# Patient Record
Sex: MALE | Race: WHITE | NOT HISPANIC OR LATINO | Employment: OTHER | URBAN - METROPOLITAN AREA
[De-identification: names, ages, dates, MRNs, and addresses within clinical notes are randomized per-mention and may not be internally consistent; named-entity substitution may affect disease eponyms.]

---

## 2019-04-17 ASSESSMENT — ACTIVITIES OF DAILY LIVING (ADL): CURRENT_FUNCTION: NO ASSISTANCE NEEDED

## 2019-04-23 ENCOUNTER — OFFICE VISIT (OUTPATIENT)
Dept: FAMILY MEDICINE | Facility: CLINIC | Age: 69
End: 2019-04-23
Payer: COMMERCIAL

## 2019-04-23 VITALS
WEIGHT: 208 LBS | SYSTOLIC BLOOD PRESSURE: 120 MMHG | RESPIRATION RATE: 16 BRPM | BODY MASS INDEX: 29.12 KG/M2 | TEMPERATURE: 97.5 F | HEIGHT: 71 IN | HEART RATE: 56 BPM | DIASTOLIC BLOOD PRESSURE: 64 MMHG

## 2019-04-23 DIAGNOSIS — Z13.6 CARDIOVASCULAR SCREENING; LDL GOAL LESS THAN 100: ICD-10-CM

## 2019-04-23 DIAGNOSIS — I71.40 ABDOMINAL AORTIC ANEURYSM (AAA) WITHOUT RUPTURE (H): Primary | ICD-10-CM

## 2019-04-23 DIAGNOSIS — C67.9 MALIGNANT NEOPLASM OF URINARY BLADDER, UNSPECIFIED SITE (H): ICD-10-CM

## 2019-04-23 DIAGNOSIS — Z23 NEED FOR VACCINATION: ICD-10-CM

## 2019-04-23 DIAGNOSIS — Z12.5 SCREENING FOR PROSTATE CANCER: ICD-10-CM

## 2019-04-23 DIAGNOSIS — I10 BENIGN ESSENTIAL HYPERTENSION: ICD-10-CM

## 2019-04-23 DIAGNOSIS — Z00.00 ANNUAL PHYSICAL EXAM: ICD-10-CM

## 2019-04-23 LAB
ALBUMIN SERPL-MCNC: 3.7 G/DL (ref 3.4–5)
ALP SERPL-CCNC: 34 U/L (ref 40–150)
ALT SERPL W P-5'-P-CCNC: 18 U/L (ref 0–70)
ANION GAP SERPL CALCULATED.3IONS-SCNC: 4 MMOL/L (ref 3–14)
AST SERPL W P-5'-P-CCNC: 21 U/L (ref 0–45)
BILIRUB SERPL-MCNC: 0.5 MG/DL (ref 0.2–1.3)
BUN SERPL-MCNC: 17 MG/DL (ref 7–30)
CALCIUM SERPL-MCNC: 9.1 MG/DL (ref 8.5–10.1)
CHLORIDE SERPL-SCNC: 105 MMOL/L (ref 94–109)
CHOLEST SERPL-MCNC: 143 MG/DL
CO2 SERPL-SCNC: 29 MMOL/L (ref 20–32)
CREAT SERPL-MCNC: 0.99 MG/DL (ref 0.66–1.25)
GFR SERPL CREATININE-BSD FRML MDRD: 77 ML/MIN/{1.73_M2}
GLUCOSE SERPL-MCNC: 89 MG/DL (ref 70–99)
HDLC SERPL-MCNC: 49 MG/DL
HGB BLD-MCNC: 14 G/DL (ref 13.3–17.7)
LDLC SERPL CALC-MCNC: 77 MG/DL
NONHDLC SERPL-MCNC: 94 MG/DL
POTASSIUM SERPL-SCNC: 4.5 MMOL/L (ref 3.4–5.3)
PROT SERPL-MCNC: 8.2 G/DL (ref 6.8–8.8)
PSA SERPL-ACNC: 2.11 UG/L (ref 0–4)
SODIUM SERPL-SCNC: 138 MMOL/L (ref 133–144)
TRIGL SERPL-MCNC: 85 MG/DL

## 2019-04-23 PROCEDURE — G0103 PSA SCREENING: HCPCS | Performed by: FAMILY MEDICINE

## 2019-04-23 PROCEDURE — 85018 HEMOGLOBIN: CPT | Performed by: FAMILY MEDICINE

## 2019-04-23 PROCEDURE — 80061 LIPID PANEL: CPT | Performed by: FAMILY MEDICINE

## 2019-04-23 PROCEDURE — 80053 COMPREHEN METABOLIC PANEL: CPT | Performed by: FAMILY MEDICINE

## 2019-04-23 PROCEDURE — 90471 IMMUNIZATION ADMIN: CPT | Performed by: FAMILY MEDICINE

## 2019-04-23 PROCEDURE — G0438 PPPS, INITIAL VISIT: HCPCS | Performed by: FAMILY MEDICINE

## 2019-04-23 PROCEDURE — 36415 COLL VENOUS BLD VENIPUNCTURE: CPT | Performed by: FAMILY MEDICINE

## 2019-04-23 PROCEDURE — 90714 TD VACC NO PRESV 7 YRS+ IM: CPT | Performed by: FAMILY MEDICINE

## 2019-04-23 RX ORDER — LISINOPRIL 10 MG/1
TABLET ORAL
COMMUNITY
Start: 2019-01-15 | End: 2019-04-24

## 2019-04-23 ASSESSMENT — MIFFLIN-ST. JEOR: SCORE: 1734.73

## 2019-04-23 ASSESSMENT — ACTIVITIES OF DAILY LIVING (ADL): CURRENT_FUNCTION: NO ASSISTANCE NEEDED

## 2019-04-23 NOTE — Clinical Note
Please abstract the following data from this visit with this patient into the appropriate field in Epic:Colonoscopy done on this date: 4/14/16 (approximately), by this group: [facility], results were q 5 years.

## 2019-04-23 NOTE — PROGRESS NOTES
"SUBJECTIVE:   Luis Stapleton is a 68 year old male who presents for Preventive Visit.    Are you in the first 12 months of your Medicare coverage?  No    Healthy Habits:     In general, how would you rate your overall health?  Good    Frequency of exercise:  6-7 days/week    Duration of exercise:  Greater than 60 minutes    Do you usually eat at least 4 servings of fruit and vegetables a day, include whole grains    & fiber and avoid regularly eating high fat or \"junk\" foods?  Yes    Taking medications regularly:  Yes    Medication side effects:  None    Ability to successfully perform activities of daily living:  No assistance needed    Home Safety:  No safety concerns identified    Hearing Impairment:  Difficulty following a conversation in a noisy restaurant or crowded room    In the past 6 months, have you been bothered by leaking of urine?  No    In general, how would you rate your overall mental or emotional health?  Good      PHQ-2 Total Score: 0    Additional concerns today:  No        Patient has history of abdominal aortic aneurysm which was repaired in 2008.  Denies any new concerns.  Following up with cardiology on a regular basis.  He also has history of bladder cancer which is treated.  Currently on surveillance with urology.  Takes lisinopril for blood pressure along with simvastatin for cholesterol.  Do you feel safe in your environment? Yes    Do you have a Health Care Directive? Yes: Patient states has Advance Directive and will bring in a copy to clinic.    Fall risk  Fallen 2 or more times in the past year?: No  Any fall with injury in the past year?: No  click delete button to remove this line now  Cognitive Screening   1) Repeat 3 items (Leader, Season, Table)    2) Clock draw: NORMAL  3) 3 item recall: Recalls 3 objects  Results: 3 items recalled: COGNITIVE IMPAIRMENT LESS LIKELY    Mini-CogTM Copyright ASHLY Santos. Licensed by the author for use in [facility]; reprinted with " permission (gloria@[facility]). All rights reserved.      Do you have sleep apnea, excessive snoring or daytime drowsiness?: no    Reviewed and updated as needed this visit by clinical staff  Tobacco  Allergies  Meds  Fam Hx  Soc Hx        Reviewed and updated as needed this visit by Provider  Meds        Social History     Tobacco Use     Smoking status: Former Smoker     Packs/day: 1.00     Years: 45.00     Pack years: 45.00     Last attempt to quit: 3/4/2008     Years since quittin.1     Smokeless tobacco: Never Used   Substance Use Topics     Alcohol use: Yes     Comment: .5 OZ WEEK     If you drink alcohol do you typically have >3 drinks per day or >7 drinks per week? No    Alcohol Use 2019   Prescreen: >3 drinks/day or >7 drinks/week? No               Current providers sharing in care for this patient include:   Patient Care Team:  Clinic, [facility] [facility] [place] as PCP - General    The following health maintenance items are reviewed in Epic and correct as of today:  Health Maintenance   Topic Date Due     HEPATITIS C SCREENING  1968     LIPID SCREEN Q5 YR MALE (SYSTEM ASSIGNED)  1984     COLON CANCER SCREEN (SYSTEM ASSIGNED)  1999     ADVANCE DIRECTIVE PLANNING Q5 YRS  2004     ZOSTER IMMUNIZATION (2 of 3) 2013     MEDICARE ANNUAL WELLNESS VISIT  2014     FALL RISK ASSESSMENT  2014     INFLUENZA VACCINE (1) 2018     DTAP/TDAP/TD IMMUNIZATION (2 - Td) 2019     PHQ-2  Completed     PNEUMOCOCCAL IMMUNIZATION 65+ LOW/MEDIUM RISK  Completed     AORTIC ANEURYSM SCREENING (SYSTEM ASSIGNED)  Completed     IPV IMMUNIZATION  Aged Out     MENINGITIS IMMUNIZATION  Aged Out       Pneumonia Vaccine:Adults age 65+ who received Pneumovax (PPSV23) at 65 years or older: Should be given PCV13 > 1 year after their most recent PPSV23    Review of Systems  CONSTITUTIONAL: NEGATIVE for fever, chills, change in weight  INTEGUMENTARY/SKIN: NEGATIVE for worrisome rashes,  "moles or lesions  EYES: NEGATIVE for vision changes or irritation  ENT/MOUTH: NEGATIVE for ear, mouth and throat problems  RESP: NEGATIVE for significant cough or SOB  BREAST: NEGATIVE for masses, tenderness or discharge  CV: NEGATIVE for chest pain, palpitations or peripheral edema  GI: NEGATIVE for nausea, abdominal pain, heartburn, or change in bowel habits  : NEGATIVE for frequency, dysuria, or hematuria  MUSCULOSKELETAL: NEGATIVE for significant arthralgias or myalgia  NEURO: NEGATIVE for weakness, dizziness or paresthesias  ENDOCRINE: NEGATIVE for temperature intolerance, skin/hair changes  HEME: NEGATIVE for bleeding problems  PSYCHIATRIC: NEGATIVE for changes in mood or affect    OBJECTIVE:   /64   Pulse 56   Temp 97.5  F (36.4  C) (Tympanic)   Resp 16   Ht 1.81 m (5' 11.26\")   Wt 94.3 kg (208 lb)   BMI 28.80 kg/m   Estimated body mass index is 28.8 kg/m  as calculated from the following:    Height as of this encounter: 1.81 m (5' 11.26\").    Weight as of this encounter: 94.3 kg (208 lb).  Physical Exam  GENERAL: healthy, alert and no distress  EYES: Eyes grossly normal to inspection, PERRL and conjunctivae and sclerae normal  HENT: ear canals and TM's normal, nose and mouth without ulcers or lesions  NECK: no adenopathy, no asymmetry, masses, or scars and thyroid normal to palpation  RESP: lungs clear to auscultation - no rales, rhonchi or wheezes  CV: regular rate and rhythm, normal S1 S2, no S3 or S4, no murmur, click or rub, no peripheral edema and peripheral pulses strong  ABDOMEN: soft, nontender, no hepatosplenomegaly, no masses and bowel sounds normal  MS: no gross musculoskeletal defects noted, no edema  SKIN: no suspicious lesions or rashes  NEURO: Normal strength and tone, mentation intact and speech normal  PSYCH: mentation appears normal, affect normal/bright    Diagnostic Test Results:  none     ASSESSMENT / PLAN:   1. Annual physical exam    - Lipid panel reflex to direct LDL " "Fasting  - Comprehensive metabolic panel  - Hemoglobin    2. Abdominal aortic aneurysm (AAA) without rupture (H)  Current no concerns.    3. CARDIOVASCULAR SCREENING; LDL GOAL LESS THAN 100  We will fill the medication once labs reviewed.  - Lipid panel reflex to direct LDL Fasting  - Comprehensive metabolic panel    4. Benign essential hypertension  We will follow-up on lab refill the medication lisinopril once labs reviewed.  - Comprehensive metabolic panel    5. Malignant neoplasm of urinary bladder, unspecified site (H)      6. Screening for prostate cancer  We will do PSA for screening.  - PSA, screen    End of Life Planning:  Patient currently has an advanced directive: No.  I have verified the patient's ablity to prepare an advanced directive/make health care decisions.  Literature was provided to assist patient in preparing an advanced directive.    COUNSELING:  Reviewed preventive health counseling, as reflected in patient instructions       Regular exercise       Healthy diet/nutrition    BP Readings from Last 1 Encounters:   04/23/19 120/64     Estimated body mass index is 28.8 kg/m  as calculated from the following:    Height as of this encounter: 1.81 m (5' 11.26\").    Weight as of this encounter: 94.3 kg (208 lb).           reports that he quit smoking about 11 years ago. He has a 45.00 pack-year smoking history. He has never used smokeless tobacco.      Appropriate preventive services were discussed with this patient, including applicable screening as appropriate for cardiovascular disease, diabetes, osteopenia/osteoporosis, and glaucoma.  As appropriate for age/gender, discussed screening for colorectal cancer, prostate cancer, breast cancer, and cervical cancer. Checklist reviewing preventive services available has been given to the patient.    Reviewed patients plan of care and provided an AVS. The Basic Care Plan (routine screening as documented in Health Maintenance) for Luis meets the Care " Plan requirement. This Care Plan has been established and reviewed with the Patient.    Counseling Resources:  ATP IV Guidelines  Pooled Cohorts Equation Calculator  Breast Cancer Risk Calculator  FRAX Risk Assessment  ICSI Preventive Guidelines  Dietary Guidelines for Americans, 2010  USDA's MyPlate  ASA Prophylaxis  Lung CA Screening    Cam Avila MD  [facility]    Identified Health Risks:

## 2019-04-24 RX ORDER — LISINOPRIL 10 MG/1
10 TABLET ORAL DAILY
Qty: 90 TABLET | Refills: 3 | Status: SHIPPED | OUTPATIENT
Start: 2019-04-24 | End: 2020-06-22

## 2019-04-24 RX ORDER — SIMVASTATIN 40 MG
40 TABLET ORAL AT BEDTIME
Qty: 90 TABLET | Refills: 3 | Status: SHIPPED | OUTPATIENT
Start: 2019-04-24 | End: 2020-06-22

## 2019-10-03 ENCOUNTER — HEALTH MAINTENANCE LETTER (OUTPATIENT)
Age: 69
End: 2019-10-03

## 2019-10-29 ENCOUNTER — HOSPITAL ENCOUNTER (OUTPATIENT)
Dept: CT IMAGING | Facility: CLINIC | Age: 69
Discharge: HOME | End: 2019-10-29
Attending: FAMILY MEDICINE | Admitting: FAMILY MEDICINE
Payer: COMMERCIAL

## 2019-10-29 ENCOUNTER — TELEPHONE (OUTPATIENT)
Dept: FAMILY MEDICINE | Facility: CLINIC | Age: 69
End: 2019-10-29

## 2019-10-29 ENCOUNTER — ANCILLARY PROCEDURE (OUTPATIENT)
Dept: GENERAL RADIOLOGY | Facility: CLINIC | Age: 69
End: 2019-10-29
Attending: FAMILY MEDICINE
Payer: COMMERCIAL

## 2019-10-29 ENCOUNTER — OFFICE VISIT (OUTPATIENT)
Dept: FAMILY MEDICINE | Facility: CLINIC | Age: 69
End: 2019-10-29
Payer: COMMERCIAL

## 2019-10-29 VITALS
OXYGEN SATURATION: 94 % | SYSTOLIC BLOOD PRESSURE: 100 MMHG | BODY MASS INDEX: 27.55 KG/M2 | DIASTOLIC BLOOD PRESSURE: 60 MMHG | WEIGHT: 199 LBS | TEMPERATURE: 98 F | HEART RATE: 82 BPM

## 2019-10-29 DIAGNOSIS — R05.9 COUGH: Primary | ICD-10-CM

## 2019-10-29 DIAGNOSIS — R91.8 LUNG MASS: ICD-10-CM

## 2019-10-29 DIAGNOSIS — R05.9 COUGH: ICD-10-CM

## 2019-10-29 DIAGNOSIS — R53.83 OTHER FATIGUE: ICD-10-CM

## 2019-10-29 LAB — RADIOLOGIST FLAGS: ABNORMAL

## 2019-10-29 PROCEDURE — 71250 CT THORAX DX C-: CPT

## 2019-10-29 PROCEDURE — 99214 OFFICE O/P EST MOD 30 MIN: CPT | Performed by: FAMILY MEDICINE

## 2019-10-29 PROCEDURE — 71046 X-RAY EXAM CHEST 2 VIEWS: CPT | Mod: FY

## 2019-10-29 NOTE — TELEPHONE ENCOUNTER
Dr. Tomas Gaspar Pulmonology calling back read the CT report and is not recommending the biopsy that radiology is recommending.  He states the pt should have a PET scan done before seeing pulmonology and then be seen in office.  Dr. Gaspar is rounding in the hospital all week and states the pt will not be seen this week but would like to s/w Dr. Avila to discuss.    Please see contact information for Dr. Gaspar on desk.  If questions see nurse.    Shayla HIGGINS RN  EP Triage

## 2019-10-29 NOTE — PROGRESS NOTES
Subjective     Luis Stapleton is a 69 year old male who presents to clinic today for the following health issues:    HPI   Acute Illness   Acute illness concerns: cough, short of breath - pain in the right side of back  Onset: 2-3 weeks    Fever: no    Chills/Sweats: no    Headache (location?): no    Sinus Pressure:no    Conjunctivitis:  no    Ear Pain: no    Rhinorrhea: no    Congestion: YES    Sore Throat: no     Cough: YES    Wheeze: YES    Decreased Appetite: YES    Nausea: no    Vomiting: no    Diarrhea:  no    Dysuria/Freq.: no    Fatigue/Achiness: YES    Sick/Strep Exposure: no     Therapies Tried and outcome: NONE  Cough is nonproductive.  He noticed decreased appetite.  Some weight loss over the last few months but he told me he is trying to lose weight.  Denies any chest pain but sometimes occasional short of breath ,low energy.          Reviewed and updated as needed this visit by Provider         Review of Systems   ROS COMP: Constitutional, HEENT, cardiovascular, pulmonary, gi and gu systems are negative, except as otherwise noted.      Objective    /60   Pulse 82   Temp 98  F (36.7  C) (Tympanic)   Wt 90.3 kg (199 lb)   SpO2 94%   BMI 27.55 kg/m    Body mass index is 27.55 kg/m .  Physical Exam   GENERAL: healthy, alert and no distress  NECK: no adenopathy, no asymmetry, masses, or scars and thyroid normal to palpation  RESP: Decreased breath sounds on the right middle lobe of lungs.  No wheezing  CV: regular rate and rhythm, normal S1 S2, no S3 or S4, no murmur, click or rub, no peripheral edema and peripheral pulses strong  ABDOMEN: soft, nontender, no hepatosplenomegaly, no masses and bowel sounds normal  MS: no gross musculoskeletal defects noted, no edema            Assessment & Plan     1. Cough    - XR Chest 2 Views; Future    2. Other fatigue      3. Lung mass  Patient chest x-ray reviewed and noted to have a round mass in the right middle lobe.  Etiology not clear.  I ordered CT  scan for further evaluation.  Differential include underlying tumor versus other causes.  At this time first we reviewed the CT scan and if there is any change we probably need to have him see pulmonology for possible further evaluation and if needed biopsy.  - CT Chest w/o Contrast; Future         Cam Avila MD  [facility]

## 2019-10-30 ENCOUNTER — OFFICE VISIT (OUTPATIENT)
Dept: FAMILY MEDICINE | Facility: CLINIC | Age: 69
End: 2019-10-30
Payer: COMMERCIAL

## 2019-10-30 VITALS
SYSTOLIC BLOOD PRESSURE: 112 MMHG | BODY MASS INDEX: 27.55 KG/M2 | DIASTOLIC BLOOD PRESSURE: 64 MMHG | OXYGEN SATURATION: 96 % | WEIGHT: 199 LBS | HEART RATE: 80 BPM | TEMPERATURE: 98.7 F

## 2019-10-30 DIAGNOSIS — R91.8 RIGHT LOWER LOBE LUNG MASS: Primary | ICD-10-CM

## 2019-10-30 PROCEDURE — 99214 OFFICE O/P EST MOD 30 MIN: CPT | Performed by: FAMILY MEDICINE

## 2019-10-30 RX ORDER — MULTIVIT WITH MINERALS/LUTEIN
1 TABLET ORAL DAILY
COMMUNITY

## 2019-10-30 NOTE — PROGRESS NOTES
Subjective     Luis Stapleton is a 69 year old male who presents to clinic today for the following health issues:    HPI   Concern - discuss CT results   Patient came today with his wife to discuss CT results.  He was seen yesterday secondary to some pain in the back and mild cough for the last few weeks along with some weight loss.  X-ray was done initially which indicate a big mass on the lung and immediately CT scan was ordered which confirmed the finding noted to have a approximatley 6 cm mass in the right lower lobe.  Patient also has remote history of bladder cancer he is going to his urologist once a year.  He denies any other symptoms at this time other than fatigue tiredness.      Patient Active Problem List   Diagnosis     CARDIOVASCULAR SCREENING; LDL GOAL LESS THAN 100     Benign essential hypertension     Malignant neoplasm of urinary bladder, unspecified site (H)     Abdominal aortic aneurysm (AAA) without rupture (H)     Past Surgical History:   Procedure Laterality Date     CYSTOSCOPY, BIOPSY BLADDER, COMBINED  2012    Procedure: COMBINED CYSTOSCOPY, BIOPSY BLADDER;;  Surgeon: Jonas Young MD;  Location:  OR     CYSTOSCOPY, RETROGRADES, COMBINED  2012    Procedure: COMBINED CYSTOSCOPY, RETROGRADES;  CYSTOSCOPY, BILATERAL RETROGRADES, BLADDER BIOPSY, FULGURATION;  Surgeon: Jonas Young MD;  Location: [facility] OR     CYSTOSCOPY, TRANSURETHRAL RESECTION (TUR) TUMOR BLADDER, COMBINED       HERNIA REPAIR       REPAIR ANEURYSM ABDOMINAL AORTA             Social History     Tobacco Use     Smoking status: Former Smoker     Packs/day: 1.00     Years: 45.00     Pack years: 45.00     Last attempt to quit: 3/4/2008     Years since quittin.6     Smokeless tobacco: Never Used   Substance Use Topics     Alcohol use: Yes     Comment: .5 OZ WEEK     No family history on file.          Reviewed and updated as needed this visit by Provider         Review of Systems   ROS COMP:  Constitutional, HEENT, cardiovascular, pulmonary, gi and gu systems are negative, except as otherwise noted.      Objective    /64   Pulse 80   Temp 98.7  F (37.1  C) (Tympanic)   Wt 90.3 kg (199 lb)   SpO2 96%   BMI 27.55 kg/m    Body mass index is 27.55 kg/m .  Physical Exam   GENERAL: healthy, alert and no distress  NECK: no adenopathy, no asymmetry, masses, or scars and thyroid normal to palpation  RESP: Slight decreased breath sound on the right side.  No wheezing.  CV: regular rate and rhythm, normal S1 S2, no S3 or S4, no murmur, click or rub, no peripheral edema and peripheral pulses strong          Assessment & Plan     1. Right lower lobe lung mass  We reviewed CT scan in the office along with x-ray report.  I had a lengthy discussion with the patient and I had a discussion with the pulmonologist this morning.  Patient has a lung mass which is  present isolated in the right lobe.  Differential includes malignancy which is high.  He will need further evaluation for that.  I spoke with pulmonology and they would like to have PET scan prior to the appointment ,which will be scheduled by their office.  He will get biopsy and tissue diagnosis.  Once biopsy is back then they can determine further management based on that.  Patient appointment is made with [facility] on 8th November.  I gave all the resources and information and possible further management steps.    Patient is medically optimal for any procedure without any further work-up.           Cam Avila MD  [facility]

## 2019-10-30 NOTE — TELEPHONE ENCOUNTER
Spoke with the pulmonologist.  Appreciate their direction.  I will see this patient this afternoon and discussed in detail.  He will probably need biopsy as well as more imaging PET scan.  We will make those arrangements.  Spoke with  and she will make the appointment next available and also advised him to have a PET scan ordered for the patient before seeing Dr. Marin

## 2019-10-30 NOTE — TELEPHONE ENCOUNTER
Patient is scheduled to see Pulmonology 11/08 at 2:45 with Dr. Gaspar and Sabi from Pulmonology will be contacting the patient to schedule a PET scan.      .Avis BELLO    [facility]

## 2019-11-04 ENCOUNTER — TELEPHONE (OUTPATIENT)
Dept: FAMILY MEDICINE | Facility: CLINIC | Age: 69
End: 2019-11-04

## 2019-11-04 NOTE — TELEPHONE ENCOUNTER
I spoke with Dr Hubert Cadena, told her it was communicated with him and he told me his office will help schedule the PET scan which was recommend by him after reviewing CT scan.  She will speak with him and let us know.  If they want to call and reach me, they can call me at my cell [phone].  Please check back later if it is scheduled.

## 2019-11-04 NOTE — TELEPHONE ENCOUNTER
Dr. Avila reached out to Nurse and informed her that he spoke with Dr. Gaspar and Dr. Gaspar told Dr Avila that his office would help schedule the PET scan which was recommended by Dr. Gaspar.     Per note below, nurse Surinder who Dr. Avila spoke to stated that she will speak with Dr. Gaspar and get back to us here at the clinic.     Called patient to inform him of this, so that he is aware. Informed patient he might get a call from Dr. Gaspar's office otherwise we will be following up to make sure patient gets this scheduled. Patient stated he will call back if he does not hear from Dr. Gaspar's office.     Please keep encounter open until patient is scheduled for PET scan prior to office visit on 11/8/19. Thank you.    Daisy Dickinson RN, BSN  [facility] [place]

## 2019-11-04 NOTE — TELEPHONE ENCOUNTER
Called [facility] [phone] to follow up regarding the PET scan.    Spoke with the nurse Surinder at the [facility] about if Dr. Gaspar ordering the PET scan for patient (since Dr. Avila stated this was Dr. Oliver recommendation to have this done).     Surinder stated that she sent a message to Dr. Gaspar, but still has not heard back from him today (3:48 pm). Surinder stated that once she hears back from Dr. Gaspar, she can give the clinic a call at [phone] and speak with a triage nurse regarding if Dr. Gaspar will be ordered the PET scan.     Informed Surinder that RN will call tomorrow to follow up on this. Surinder does not have a direct line, Surinder stated to call the main number above and have them transfer the phone over to the nurse. Surinder is okay with clinic checking in with Dr. Gaspar's office tomorrow.     Again, patient has scheduled office visit with [facility] on 11/8/19 and would need the PET scan done prior. Please make sure PET scan is ordered and patient is scheduled for this prior to appointment.     Called patient to update him that we still have not heard back from Dr. Gaspar. Message was sent though to him. Patient verbalized understanding and agrees with plan. Informed patient RN's will follow up tomorrow and inform him of what is going on.     Daisy Dickinson RN, BSN  [facility]

## 2019-11-04 NOTE — TELEPHONE ENCOUNTER
Called patient back to gather more information.     Per Dr. Avila's note from office visit below:   I spoke with pulmonology and they would like to have PET scan prior to the appointment ,which will be scheduled by their office.    Advised for [facility] to [facility] (patient has the contact information) and ask for Dr. Gaspar to order the PET scan and have them help schedule this before his appointment on 11/8/19. Patient verbalized understanding and agrees with plan.     Patient will call back if he has any issues regarding this.     Daisy Dickinson RN, BSN  [facility] [place]

## 2019-11-04 NOTE — TELEPHONE ENCOUNTER
Reason for call:  Other   Patient called regarding (reason for call): call back  Additional comments: Pt states that Dr. Avila had scheduled a pulmonologist appt. For pt on 11/8/2019.  Pt states that Dr. Avila had told him he needed a pet scan prior to this appt.  Pt is unclear about who is scheduling the pet scan and has not heard anything about it yet.      Phone number to reach patient:  Home number on file [phone] (home) or Cell number on file:    No relevant phone numbers on file.       Best Time:  All day today is fine 11/4    Can we leave a detailed message on this number?  YES

## 2019-11-04 NOTE — TELEPHONE ENCOUNTER
Patient states that he spoke with Surinder DAVIES at [facility] and was informed that patient's primary doctor needs to order the PET scan.    Patient needs PET scan done before his 11/8 appointment.  Carlie García RN

## 2019-11-05 ENCOUNTER — TELEPHONE (OUTPATIENT)
Dept: FAMILY MEDICINE | Facility: CLINIC | Age: 69
End: 2019-11-05

## 2019-11-05 PROBLEM — R91.8 RIGHT LOWER LOBE LUNG MASS: Status: ACTIVE | Noted: 2019-11-05

## 2019-11-05 NOTE — TELEPHONE ENCOUNTER
Pt called back and he received a call from Dr Gaspar's office and was advised to keep his appt on Nov 8 even though he has not had the PET scan. Per pt request,  TC scheduled the PET scan at [facility] on Nov 18 at 9:15 am.  Prep : Arrive at 9:00 am  NPO 6 hours prior   No physical exercise 24 hours prior to test  Eat a low carb high protein meal 7 hours prior.  Pt notified and agrees  With the plan.  Jacqueline Allen,

## 2019-11-05 NOTE — TELEPHONE ENCOUNTER
Reason for Call:  Other call back    Detailed comments: Patient called upset because he had an appt today for a pet scan and was cancelled due to scan machine is broken, they offered to re schedule but only until next month. Patient wants to talk to care team for suggestions     Phone Number Patient can be reached at: Cell number on file:    No relevant phone numbers on file.       Best Time: any     Can we leave a detailed message on this number? YES    Call taken on 11/5/2019 at 12:57 PM by Francheska Moreno

## 2019-11-05 NOTE — TELEPHONE ENCOUNTER
Called [facility] to find the next available time for a PET scan:  Friday, Nov 8 at the   Monday, Nov 18 at [facility]  Tuesday, Nov 19 at [facility]  Spoke with the pt and gave him the information. Pt stated the Sabi from Dr Gaspar's office was also working on this and he was waiting for a call back from her. He will call back and speak with a TC with how he wants to proceed.  Jacqueline Allen,

## 2019-11-05 NOTE — TELEPHONE ENCOUNTER
Routing back to team to assist with helping patient get scheduled with PET scan at different location. Thank you.    Daisy Dickinson RN, BSN  [facility]

## 2019-11-05 NOTE — TELEPHONE ENCOUNTER
I have tried to get more information.  PET scan is ordered and spoke with the prior authorization people.  The reason to get PET scan and also per recommendation of pulmonologist.  Gave information to the .  She will try to get more information and see if that can be scheduled.  I spoke with the patient as well and informed him PET scan is more of the diagnostic and see if there is any other areas may or may not be involved.  The next step in this kind of finding would be to see pulmonologist and see when the biopsy can be done to make a tissue diagnosis.  We will try to get it done sooner.

## 2019-11-05 NOTE — TELEPHONE ENCOUNTER
Huddled with Dr. Avila, Dr. Avila stated patient is scheduled for 1:30 pm for PET scan today 11/5/19. Closing encounter at this time.     Daisy Dickinson RN, BSN  [facility] [place]

## 2019-11-08 ENCOUNTER — TRANSFERRED RECORDS (OUTPATIENT)
Dept: HEALTH INFORMATION MANAGEMENT | Facility: CLINIC | Age: 69
End: 2019-11-08

## 2019-11-15 ENCOUNTER — TELEPHONE (OUTPATIENT)
Dept: FAMILY MEDICINE | Facility: CLINIC | Age: 69
End: 2019-11-15

## 2019-11-15 NOTE — TELEPHONE ENCOUNTER
Reason for Call:  Other [facility]    Detailed comments: Alecwily from [facility] called she need pt pre-op paper work to be Faxed soon as possibil pt has surgery on Monday morning 11/18/2019. Please fax to Fax # [phone]    Phone Number Patient can be reached at: Other phone number:  [phone]    Best Time: anytime    Can we leave a detailed message on this number? YES    Call taken on 11/15/2019 at 11:15 AM by Stefanie Malagon

## 2019-11-15 NOTE — TELEPHONE ENCOUNTER
Pre-op faxed.      .vAis BELLO    [facility] [place]

## 2019-11-26 ENCOUNTER — TRANSFERRED RECORDS (OUTPATIENT)
Dept: HEALTH INFORMATION MANAGEMENT | Facility: CLINIC | Age: 69
End: 2019-11-26

## 2019-12-06 ENCOUNTER — OFFICE VISIT (OUTPATIENT)
Dept: FAMILY MEDICINE | Facility: CLINIC | Age: 69
End: 2019-12-06
Payer: COMMERCIAL

## 2019-12-06 VITALS
BODY MASS INDEX: 27.55 KG/M2 | WEIGHT: 199 LBS | OXYGEN SATURATION: 98 % | TEMPERATURE: 97.9 F | SYSTOLIC BLOOD PRESSURE: 120 MMHG | HEART RATE: 68 BPM | DIASTOLIC BLOOD PRESSURE: 60 MMHG

## 2019-12-06 DIAGNOSIS — C34.31 MALIGNANT NEOPLASM OF LOWER LOBE OF RIGHT LUNG (H): ICD-10-CM

## 2019-12-06 DIAGNOSIS — I71.40 ABDOMINAL AORTIC ANEURYSM (AAA) WITHOUT RUPTURE (H): ICD-10-CM

## 2019-12-06 DIAGNOSIS — K76.9 LIVER LESION: ICD-10-CM

## 2019-12-06 DIAGNOSIS — Z01.818 PREOP GENERAL PHYSICAL EXAM: Primary | ICD-10-CM

## 2019-12-06 PROCEDURE — 99214 OFFICE O/P EST MOD 30 MIN: CPT | Performed by: FAMILY MEDICINE

## 2019-12-06 NOTE — PROGRESS NOTES
[facility] [place]  [address] [facility] [address]  [phone]  Dept: [phone]    PRE-OP EVALUATION:  Today's date: 2019    Luis Stapleton (: 1950) presents for pre-operative evaluation assessment as requested by Dr. Hong.  He requires evaluation and anesthesia risk assessment prior to undergoing surgery/procedure for treatment of liver lesion.    Proposed Surgery/ Procedure: lap surgical liver lesion biopsy  Date of Surgery/ Procedure: 12/10/19  Time of Surgery/ Procedure: 7am  Hospital/Surgical Facility: [facility]  Fax number for surgical facility: [phone]    Primary Physician: Cam Avila  Type of Anesthesia Anticipated: to be determined    Patient has a Health Care Directive or Living Will:  YES     1. NO - Do you have a history of heart attack, stroke, stent, bypass or surgery on an artery in the head, neck, heart or legs?   2. NO - Do you ever have any pain or discomfort in your chest?  3. NO - Do you have a history of  Heart Failure?  4. NO - Are you troubled by shortness of breath when: walking on the level, up a slight hill or at night?  5. NO - Do you currently have a cold, bronchitis or other respiratory infection?  6. YES - Do you have a cough, shortness of breath or wheezing?  7. NO - Do you sometimes get pains in the calves of your legs when you walk?  8. NO - Do you or anyone in your family have previous history of blood clots?  9. NO - Do you or does anyone in your family have a serious bleeding problem such as prolonged bleeding following surgeries or cuts?  10. NO - Have you ever had problems with anemia or been told to take iron pills?  11. NO - Have you had any abnormal blood loss such as black, tarry or bloody stools, or abnormal vaginal bleeding?  12. NO - Have you ever had a blood transfusion?  13. NO - Have you or any of your relatives ever had problems with anesthesia?  14. NO - Do you have sleep apnea, excessive snoring or daytime  drowsiness?  15. NO - Do you have any prosthetic heart valves?  16. NO - Do you have prosthetic joints?  17. NO - Is there any chance that you may be pregnant?      HPI:     HPI related to upcoming procedure:       See problem list for active medical problems.  Problems all longstanding and stable, except as noted/documented.  See ROS for pertinent symptoms related to these conditions.      MEDICAL HISTORY:     Patient Active Problem List    Diagnosis Date Noted     Malignant neoplasm of lower lobe of right lung (H) 12/06/2019     Priority: Medium     Right lower lobe lung mass 11/05/2019     Priority: Medium     Benign essential hypertension 04/23/2019     Priority: Medium     Malignant neoplasm of urinary bladder, unspecified site (H) 04/23/2019     Priority: Medium     Abdominal aortic aneurysm (AAA) without rupture (H) 04/23/2019     Priority: Medium     surgey in 2008       CARDIOVASCULAR SCREENING; LDL GOAL LESS THAN 100 10/31/2010     Priority: Medium      Past Medical History:   Diagnosis Date     Aneurysm (H)     ABDOMINAL      Calculus of kidney      Diverticulosis of colon (without mention of hemorrhage)      Malignant neoplasm of bladder, part unspecified      Other and unspecified hyperlipidemia      Past Surgical History:   Procedure Laterality Date     CYSTOSCOPY, BIOPSY BLADDER, COMBINED  6/18/2012    Procedure: COMBINED CYSTOSCOPY, BIOPSY BLADDER;;  Surgeon: Jonas Young MD;  Location:  OR     CYSTOSCOPY, RETROGRADES, COMBINED  6/18/2012    Procedure: COMBINED CYSTOSCOPY, RETROGRADES;  CYSTOSCOPY, BILATERAL RETROGRADES, BLADDER BIOPSY, FULGURATION;  Surgeon: Jonas Young MD;  Location:  OR     CYSTOSCOPY, TRANSURETHRAL RESECTION (TUR) TUMOR BLADDER, COMBINED       HERNIA REPAIR       REPAIR ANEURYSM ABDOMINAL AORTA      2009     Current Outpatient Medications   Medication Sig Dispense Refill     Cholecalciferol (VITAMIN D3 PO) Take 1,000 Units by mouth.         lisinopril  (PRINIVIL/ZESTRIL) 10 MG tablet Take 1 tablet (10 mg) by mouth daily 90 tablet 3     multivitamin (CENTRUM SILVER) tablet Take 1 tablet by mouth daily       Nutritional Supplements (BLADDER 2.2 PO) Take 1 tablet by mouth 2 times daily.         simvastatin (ZOCOR) 40 MG tablet Take 1 tablet (40 mg) by mouth At Bedtime 90 tablet 3     OTC products: None, except as noted above    No Known Allergies   Latex Allergy: NO    Social History     Tobacco Use     Smoking status: Former Smoker     Packs/day: 1.00     Years: 45.00     Pack years: 45.00     Last attempt to quit: 3/4/2008     Years since quittin.7     Smokeless tobacco: Never Used   Substance Use Topics     Alcohol use: Yes     Comment: .5 OZ WEEK     History   Drug Use Unknown       REVIEW OF SYSTEMS:   CONSTITUTIONAL: NEGATIVE for fever, chills, change in weight  INTEGUMENTARY/SKIN: NEGATIVE for worrisome rashes, moles or lesions  EYES: NEGATIVE for vision changes or irritation  ENT/MOUTH: NEGATIVE for ear, mouth and throat problems  RESP: NEGATIVE for significant cough or SOB  BREAST: NEGATIVE for masses, tenderness or discharge  CV: NEGATIVE for chest pain, palpitations or peripheral edema  GI: NEGATIVE for nausea, abdominal pain, heartburn, or change in bowel habits  : NEGATIVE for frequency, dysuria, or hematuria  MUSCULOSKELETAL: NEGATIVE for significant arthralgias or myalgia  NEURO: NEGATIVE for weakness, dizziness or paresthesias  ENDOCRINE: NEGATIVE for temperature intolerance, skin/hair changes  HEME: NEGATIVE for bleeding problems  PSYCHIATRIC: NEGATIVE for changes in mood or affect    EXAM:   /60   Pulse 68   Temp 97.9  F (36.6  C) (Tympanic)   Wt 90.3 kg (199 lb)   SpO2 98%   BMI 27.55 kg/m    GENERAL APPEARANCE: healthy, alert and no distress  HENT: ear canals and TM's normal and nose and mouth without ulcers or lesions  RESP: lungs clear to auscultation - no rales, rhonchi or wheezes  CV: regular rate and rhythm, normal S1 S2, no  S3 or S4 and no murmur, click or rub   ABDOMEN: soft, nontender, no HSM or masses and bowel sounds normal  NEURO: Normal strength and tone, sensory exam grossly normal, mentation intact and speech normal    DIAGNOSTICS:   US  REPORT -2015    ULTRASOUND AORTA/IVC/ILIAC DUPLEX COMPLETE February 25, 2015   hours     HISTORY: 64-year-old male with repair of ruptured abdominal aortic  aneurysm.     COMPARISON: March 16, 2010.     TECHNIQUE: Color Doppler and spectral waveform analysis performed  throughout the abdominal aorta.     FINDINGS: Aortic tube graft is widely patent. No velocity elevation to  suggest stenosis. Both common iliac arteries are patent. The right  common iliac artery measures up to 0.9 cm and left measures up to 1.2  cm.     IMPRESSION  IMPRESSION: Widely patent aortic surgical straight graft.       Recent Labs   Lab Test 04/23/19  0802   HGB 14.0      POTASSIUM 4.5   CR 0.99        IMPRESSION:   Reason for surgery/procedure:   Diagnosis/reason for consult:     ICD-10-CM    1. Preop general physical exam Z01.818    2. Malignant neoplasm of lower lobe of right lung (H) C34.31     CT scan done.   3. Liver lesion K76.9    4. Abdominal aortic aneurysm (AAA) without rupture (H) I71.4          The proposed surgical procedure is considered INTERMEDIATE risk.    REVISED CARDIAC RISK INDEX  The patient has the following serious cardiovascular risks for perioperative complications such as (MI, PE, VFib and 3  AV Block):  No serious cardiac risks  INTERPRETATION: 0 risks: Class I (very low risk - 0.4% complication rate)    The patient has the following additional risks for perioperative complications:  No identified additional risks      ICD-10-CM    1. Preop general physical exam Z01.818    2. Malignant neoplasm of lower lobe of right lung (H) C34.31     CT scan done.   3. Liver lesion K76.9    4. Abdominal aortic aneurysm (AAA) without rupture (H) I71.4        RECOMMENDATIONS:   Patient has a history of  abdominal aortic aneurysm without rupture his last exam was 2015, his stent was patent at that time. He denies any abdominal pain or issue with BP..  His blood pressure is well controlled.      --Patient is to take all scheduled medications on the day of surgery EXCEPT for modifications listed below.    APPROVAL GIVEN to proceed with proposed procedure, without further diagnostic evaluation       Signed Electronically by: Cam Avila MD    Copy of this evaluation report is provided to requesting physician.    [place] Preop Guidelines    Revised Cardiac Risk Index

## 2019-12-09 ENCOUNTER — TRANSFERRED RECORDS (OUTPATIENT)
Dept: HEALTH INFORMATION MANAGEMENT | Facility: CLINIC | Age: 69
End: 2019-12-09

## 2019-12-10 ENCOUNTER — TRANSFERRED RECORDS (OUTPATIENT)
Dept: HEALTH INFORMATION MANAGEMENT | Facility: CLINIC | Age: 69
End: 2019-12-10

## 2019-12-11 LAB
ALT SERPL-CCNC: 93 IU/L (ref 8–45)
AST SERPL-CCNC: 119 IU/L (ref 2–40)
CREAT SERPL-MCNC: 1 MG/DL (ref 0.72–1.25)
GFR SERPL CREATININE-BSD FRML MDRD: >60 ML/MIN/1.73M2
GLUCOSE SERPL-MCNC: 100 MG/DL (ref 65–100)
INR PPP: 1.3
POTASSIUM SERPL-SCNC: 5 MMOL/L (ref 3.5–5)

## 2019-12-16 ENCOUNTER — TRANSFERRED RECORDS (OUTPATIENT)
Dept: HEALTH INFORMATION MANAGEMENT | Facility: CLINIC | Age: 69
End: 2019-12-16

## 2019-12-20 ENCOUNTER — TRANSFERRED RECORDS (OUTPATIENT)
Dept: HEALTH INFORMATION MANAGEMENT | Facility: CLINIC | Age: 69
End: 2019-12-20

## 2019-12-26 ENCOUNTER — ANESTHESIA EVENT (OUTPATIENT)
Dept: SURGERY | Facility: CLINIC | Age: 69
End: 2019-12-26
Payer: COMMERCIAL

## 2019-12-27 ENCOUNTER — ANESTHESIA (OUTPATIENT)
Dept: SURGERY | Facility: CLINIC | Age: 69
End: 2019-12-27
Payer: COMMERCIAL

## 2019-12-27 ENCOUNTER — HOSPITAL ENCOUNTER (OUTPATIENT)
Facility: CLINIC | Age: 69
Discharge: HOME | End: 2019-12-27
Attending: THORACIC SURGERY (CARDIOTHORACIC VASCULAR SURGERY) | Admitting: THORACIC SURGERY (CARDIOTHORACIC VASCULAR SURGERY)
Payer: COMMERCIAL

## 2019-12-27 VITALS
DIASTOLIC BLOOD PRESSURE: 68 MMHG | BODY MASS INDEX: 25.92 KG/M2 | RESPIRATION RATE: 16 BRPM | WEIGHT: 191.38 LBS | HEIGHT: 72 IN | HEART RATE: 63 BPM | TEMPERATURE: 98.2 F | OXYGEN SATURATION: 97 % | SYSTOLIC BLOOD PRESSURE: 122 MMHG

## 2019-12-27 LAB — POTASSIUM SERPL-SCNC: 4.1 MMOL/L (ref 3.4–5.3)

## 2019-12-27 PROCEDURE — 25000128 H RX IP 250 OP 636: Performed by: NURSE ANESTHETIST, CERTIFIED REGISTERED

## 2019-12-27 PROCEDURE — 25800030 ZZH RX IP 258 OP 636: Performed by: NURSE ANESTHETIST, CERTIFIED REGISTERED

## 2019-12-27 PROCEDURE — 36000052 ZZH SURGERY LEVEL 2 EA 15 ADDTL MIN: Performed by: THORACIC SURGERY (CARDIOTHORACIC VASCULAR SURGERY)

## 2019-12-27 PROCEDURE — 37000009 ZZH ANESTHESIA TECHNICAL FEE, EACH ADDTL 15 MIN: Performed by: THORACIC SURGERY (CARDIOTHORACIC VASCULAR SURGERY)

## 2019-12-27 PROCEDURE — 25000125 ZZHC RX 250: Performed by: NURSE ANESTHETIST, CERTIFIED REGISTERED

## 2019-12-27 PROCEDURE — 36415 COLL VENOUS BLD VENIPUNCTURE: CPT | Performed by: ANESTHESIOLOGY

## 2019-12-27 PROCEDURE — 40000170 ZZH STATISTIC PRE-PROCEDURE ASSESSMENT II: Performed by: THORACIC SURGERY (CARDIOTHORACIC VASCULAR SURGERY)

## 2019-12-27 PROCEDURE — 37000008 ZZH ANESTHESIA TECHNICAL FEE, 1ST 30 MIN: Performed by: THORACIC SURGERY (CARDIOTHORACIC VASCULAR SURGERY)

## 2019-12-27 PROCEDURE — 36000054 ZZH SURGERY LEVEL 2 W FLUORO 1ST 30 MIN: Performed by: THORACIC SURGERY (CARDIOTHORACIC VASCULAR SURGERY)

## 2019-12-27 PROCEDURE — 71000012 ZZH RECOVERY PHASE 1 LEVEL 1 FIRST HR: Performed by: THORACIC SURGERY (CARDIOTHORACIC VASCULAR SURGERY)

## 2019-12-27 PROCEDURE — 71000027 ZZH RECOVERY PHASE 2 EACH 15 MINS: Performed by: THORACIC SURGERY (CARDIOTHORACIC VASCULAR SURGERY)

## 2019-12-27 PROCEDURE — 25000125 ZZHC RX 250: Performed by: THORACIC SURGERY (CARDIOTHORACIC VASCULAR SURGERY)

## 2019-12-27 PROCEDURE — C1788 PORT, INDWELLING, IMP: HCPCS | Performed by: THORACIC SURGERY (CARDIOTHORACIC VASCULAR SURGERY)

## 2019-12-27 PROCEDURE — 84132 ASSAY OF SERUM POTASSIUM: CPT | Performed by: ANESTHESIOLOGY

## 2019-12-27 PROCEDURE — 27210794 ZZH OR GENERAL SUPPLY STERILE: Performed by: THORACIC SURGERY (CARDIOTHORACIC VASCULAR SURGERY)

## 2019-12-27 DEVICE — CATH PORT POWERPORT CLEARVUE ISP 8FR 5608062: Type: IMPLANTABLE DEVICE | Site: CHEST  WALL | Status: FUNCTIONAL

## 2019-12-27 RX ORDER — PROPOFOL 10 MG/ML
INJECTION, EMULSION INTRAVENOUS PRN
Status: DISCONTINUED | OUTPATIENT
Start: 2019-12-27 | End: 2019-12-27

## 2019-12-27 RX ORDER — CEFAZOLIN SODIUM 1 G/3ML
INJECTION, POWDER, FOR SOLUTION INTRAMUSCULAR; INTRAVENOUS PRN
Status: DISCONTINUED | OUTPATIENT
Start: 2019-12-27 | End: 2019-12-27

## 2019-12-27 RX ORDER — SODIUM CHLORIDE, SODIUM LACTATE, POTASSIUM CHLORIDE, CALCIUM CHLORIDE 600; 310; 30; 20 MG/100ML; MG/100ML; MG/100ML; MG/100ML
INJECTION, SOLUTION INTRAVENOUS CONTINUOUS
Status: DISCONTINUED | OUTPATIENT
Start: 2019-12-27 | End: 2019-12-27 | Stop reason: HOSPADM

## 2019-12-27 RX ORDER — LIDOCAINE HYDROCHLORIDE 10 MG/ML
INJECTION, SOLUTION INFILTRATION; PERINEURAL PRN
Status: DISCONTINUED | OUTPATIENT
Start: 2019-12-27 | End: 2019-12-27 | Stop reason: HOSPADM

## 2019-12-27 RX ORDER — ONDANSETRON 2 MG/ML
4 INJECTION INTRAMUSCULAR; INTRAVENOUS EVERY 30 MIN PRN
Status: DISCONTINUED | OUTPATIENT
Start: 2019-12-27 | End: 2019-12-27 | Stop reason: HOSPADM

## 2019-12-27 RX ORDER — ONDANSETRON 2 MG/ML
INJECTION INTRAMUSCULAR; INTRAVENOUS PRN
Status: DISCONTINUED | OUTPATIENT
Start: 2019-12-27 | End: 2019-12-27

## 2019-12-27 RX ORDER — ONDANSETRON 4 MG/1
4 TABLET, ORALLY DISINTEGRATING ORAL EVERY 30 MIN PRN
Status: DISCONTINUED | OUTPATIENT
Start: 2019-12-27 | End: 2019-12-27 | Stop reason: HOSPADM

## 2019-12-27 RX ORDER — FENTANYL CITRATE 0.05 MG/ML
25-50 INJECTION, SOLUTION INTRAMUSCULAR; INTRAVENOUS
Status: DISCONTINUED | OUTPATIENT
Start: 2019-12-27 | End: 2019-12-27 | Stop reason: HOSPADM

## 2019-12-27 RX ORDER — PROPOFOL 10 MG/ML
INJECTION, EMULSION INTRAVENOUS CONTINUOUS PRN
Status: DISCONTINUED | OUTPATIENT
Start: 2019-12-27 | End: 2019-12-27

## 2019-12-27 RX ORDER — NALOXONE HYDROCHLORIDE 0.4 MG/ML
.1-.4 INJECTION, SOLUTION INTRAMUSCULAR; INTRAVENOUS; SUBCUTANEOUS
Status: DISCONTINUED | OUTPATIENT
Start: 2019-12-27 | End: 2019-12-27 | Stop reason: HOSPADM

## 2019-12-27 RX ORDER — MEPERIDINE HYDROCHLORIDE 25 MG/ML
12.5 INJECTION INTRAMUSCULAR; INTRAVENOUS; SUBCUTANEOUS
Status: DISCONTINUED | OUTPATIENT
Start: 2019-12-27 | End: 2019-12-27 | Stop reason: HOSPADM

## 2019-12-27 RX ORDER — SODIUM CHLORIDE, SODIUM LACTATE, POTASSIUM CHLORIDE, CALCIUM CHLORIDE 600; 310; 30; 20 MG/100ML; MG/100ML; MG/100ML; MG/100ML
INJECTION, SOLUTION INTRAVENOUS CONTINUOUS PRN
Status: DISCONTINUED | OUTPATIENT
Start: 2019-12-27 | End: 2019-12-27

## 2019-12-27 RX ORDER — HYDROMORPHONE HYDROCHLORIDE 1 MG/ML
.3-.5 INJECTION, SOLUTION INTRAMUSCULAR; INTRAVENOUS; SUBCUTANEOUS EVERY 10 MIN PRN
Status: DISCONTINUED | OUTPATIENT
Start: 2019-12-27 | End: 2019-12-27 | Stop reason: HOSPADM

## 2019-12-27 RX ORDER — PANTOPRAZOLE SODIUM 40 MG/1
40 TABLET, DELAYED RELEASE ORAL DAILY
COMMUNITY
Start: 2019-12-11 | End: 2020-06-30

## 2019-12-27 RX ORDER — FENTANYL CITRATE 50 UG/ML
INJECTION, SOLUTION INTRAMUSCULAR; INTRAVENOUS PRN
Status: DISCONTINUED | OUTPATIENT
Start: 2019-12-27 | End: 2019-12-27

## 2019-12-27 RX ORDER — MAGNESIUM HYDROXIDE 1200 MG/15ML
LIQUID ORAL PRN
Status: DISCONTINUED | OUTPATIENT
Start: 2019-12-27 | End: 2019-12-27 | Stop reason: HOSPADM

## 2019-12-27 RX ORDER — LIDOCAINE HYDROCHLORIDE 20 MG/ML
INJECTION, SOLUTION INFILTRATION; PERINEURAL PRN
Status: DISCONTINUED | OUTPATIENT
Start: 2019-12-27 | End: 2019-12-27

## 2019-12-27 RX ADMIN — PROPOFOL 20 MG: 10 INJECTION, EMULSION INTRAVENOUS at 12:47

## 2019-12-27 RX ADMIN — SODIUM CHLORIDE, POTASSIUM CHLORIDE, SODIUM LACTATE AND CALCIUM CHLORIDE: 600; 310; 30; 20 INJECTION, SOLUTION INTRAVENOUS at 12:32

## 2019-12-27 RX ADMIN — ONDANSETRON 4 MG: 2 INJECTION INTRAMUSCULAR; INTRAVENOUS at 12:39

## 2019-12-27 RX ADMIN — PROPOFOL 100 MCG/KG/MIN: 10 INJECTION, EMULSION INTRAVENOUS at 12:35

## 2019-12-27 RX ADMIN — CEFAZOLIN 2 G: 1 INJECTION, POWDER, FOR SOLUTION INTRAMUSCULAR; INTRAVENOUS at 12:40

## 2019-12-27 RX ADMIN — LIDOCAINE HYDROCHLORIDE 30 MG: 20 INJECTION, SOLUTION INFILTRATION; PERINEURAL at 12:35

## 2019-12-27 RX ADMIN — PHENYLEPHRINE HYDROCHLORIDE 100 MCG: 10 INJECTION INTRAVENOUS at 12:55

## 2019-12-27 RX ADMIN — FENTANYL CITRATE 50 MCG: 50 INJECTION, SOLUTION INTRAMUSCULAR; INTRAVENOUS at 12:36

## 2019-12-27 ASSESSMENT — LIFESTYLE VARIABLES: TOBACCO_USE: 1

## 2019-12-27 ASSESSMENT — ENCOUNTER SYMPTOMS: SEIZURES: 0

## 2019-12-27 ASSESSMENT — MIFFLIN-ST. JEOR: SCORE: 1662.1

## 2019-12-27 NOTE — ANESTHESIA POSTPROCEDURE EVALUATION
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    Anesthesia Type:  MAC    Note:  Anesthesia Post Evaluation    Patient location during evaluation: PACU  Patient participation: Able to fully participate in evaluation  Level of consciousness: awake  Pain management: adequate  Airway patency: patent  Cardiovascular status: acceptable  Respiratory status: acceptable  Hydration status: acceptable  PONV: none     Anesthetic complications: None          Last vitals:  Vitals:    12/27/19 1315 12/27/19 1330 12/27/19 1345   BP: 103/60 118/59 119/72   Pulse: 64 63    Resp: 20 17 15   Temp: 36.7  C (98.1  F) 36.8  C (98.2  F)    SpO2: 98% 97% 97%         Electronically Signed By: LEANDRO DEMPSEY MD  December 27, 2019  2:39 PM

## 2019-12-27 NOTE — DISCHARGE INSTRUCTIONS
Same Day Surgery Discharge Instructions for  Sedation and General Anesthesia       It's not unusual to feel dizzy, light-headed or faint for up to 24 hours after surgery or while taking pain medication.  If you have these symptoms: sit for a few minutes before standing and have someone assist you when you get up to walk or use the bathroom.      You should rest and relax for the next 24 hours. We recommend you make arrangements to have an adult stay with you for at least 24 hours after your discharge.  Avoid hazardous and strenuous activity.      DO NOT DRIVE any vehicle or operate mechanical equipment for 24 hours following the end of your surgery.  Even though you may feel normal, your reactions may be affected by the medication you have received.      Do not drink alcoholic beverages for 24 hours following surgery.       Slowly progress to your regular diet as you feel able. It's not unusual to feel nauseated and/or vomit after receiving anesthesia.  If you develop these symptoms, drink clear liquids (apple juice, ginger ale, broth, 7-up, etc. ) until you feel better.  If your nausea and vomiting persists for 24 hours, please notify your surgeon.        All narcotic pain medications, along with inactivity and anesthesia, can cause constipation. Drinking plenty of liquids and increasing fiber intake will help.      For any questions of a medical nature, call your surgeon.      Do not make important decisions for 24 hours.      If you had general anesthesia, you may have a sore throat for a couple of days related to the breathing tube used during surgery.  You may use Cepacol lozenges to help with this discomfort.  If it worsens or if you develop a fever, contact your surgeon.       If you feel your pain is not well managed with the pain medications prescribed by your surgeon, please contact your surgeon's office to let them know so they can address your concerns.       Discharge Instructions for  Port  Placement      General Instructions:    You will be given a card with information about your port.  You should carry this with you in your wallet/billfold. It provides information to clinicians about your port.  You should also carry the card in case your port triggers any security devices.     Activity:    Limit your activity for the first few days after your port is placed    Incision Care:     Unless otherwise directed by your surgeon, the dressing may removed tomorrow.      If a topical skin adhesive was used to close incision, you may shower tomorrow. Do not use soaps, lotions, or ointments on the wound area. Do not scrub the wound. After bathing, pat the wound dry with a soft towel.  Do not scratch, rub, or pick at the strips or film. Do not place tape directly over the strips or film.  Do not apply liquids (such as peroxide), ointments, or creams to the wound while the strips or film are in place.    Call your surgeon if you have:     Redness, swelling or drainage from incision     A fever of 101 F or greater.      Nausea or vomiting.     Pain that is not controlled by medications and/or rest.     Questions or concerns.        Restart Lovenox tomorrow 12/28    **If you have questions or concerns about your procedure,  call Dr. Sosa at [phone]**

## 2019-12-27 NOTE — ANESTHESIA PREPROCEDURE EVALUATION
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) [C34.31]    Procedure(s):  INSERTION, VASCULAR ACCESS PORT FLAT PLATE X RAY    Past Medical History:   Diagnosis Date     Aneurysm (H)     ABDOMINAL      Calculus of kidney      Diverticulosis of colon (without mention of hemorrhage)      Hypertension      Lung cancer (H)      Malignant neoplasm of bladder, part unspecified      Other and unspecified hyperlipidemia      Past Surgical History:   Procedure Laterality Date     ABDOMEN SURGERY      AAA     ABDOMEN SURGERY  12/10/2019    liver biopsies and removal of nodules     BRONCHOSCOPY       CYSTOSCOPY, BIOPSY BLADDER, COMBINED  2012    Procedure: COMBINED CYSTOSCOPY, BIOPSY BLADDER;;  Surgeon: Jonas Young MD;  Location: [facility] OR     CYSTOSCOPY, RETROGRADES, COMBINED  2012    Procedure: COMBINED CYSTOSCOPY, RETROGRADES;  CYSTOSCOPY, BILATERAL RETROGRADES, BLADDER BIOPSY, FULGURATION;  Surgeon: Jonas Young MD;  Location: [facility] OR     CYSTOSCOPY, TRANSURETHRAL RESECTION (TUR) TUMOR BLADDER, COMBINED       HERNIA REPAIR       REPAIR ANEURYSM ABDOMINAL AORTA             Anesthesia Evaluation     . Pt has had prior anesthetic.     No history of anesthetic complications          ROS/MED HX    ENT/Pulmonary:     (+)tobacco use, Past use , . .   (-) sleep apnea   Neurologic:      (-) seizures and CVA   Cardiovascular: Comment: AAA s/p repair    (+) Dyslipidemia, hypertension----. : . . . :. .       METS/Exercise Tolerance:     Hematologic:         Musculoskeletal:         GI/Hepatic:        (-) GERD and liver disease   Renal/Genitourinary:     (+) Nephrolithiasis ,    (-) renal disease   Endo:      (-) Type II DM and thyroid disease   Psychiatric:         Infectious Disease:         Malignancy:   (+) Malignancy History of Lung          Other:      "                     Physical Exam  Normal systems: cardiovascular and pulmonary    Airway   Mallampati: II  TM distance: >3 FB  Neck ROM: full    Dental   (+) upper dentures    Cardiovascular       Pulmonary             Lab Results   Component Value Date    WBC 17.2 (H) 03/05/2009    HGB 14.0 04/23/2019    HCT 30.8 (L) 03/05/2009     03/05/2009     04/23/2019    POTASSIUM 4.1 12/27/2019    CHLORIDE 105 04/23/2019    CO2 29 04/23/2019    BUN 17 04/23/2019    CR 0.99 04/23/2019    GLC 89 04/23/2019    HORACE 9.1 04/23/2019    ALBUMIN 3.7 04/23/2019    PROTTOTAL 8.2 04/23/2019    ALT 18 04/23/2019    AST 21 04/23/2019    ALKPHOS 34 (L) 04/23/2019    BILITOTAL 0.5 04/23/2019    LIPASE 67 03/04/2009    PTT 56 (H) 03/04/2009    INR 1.28 (H) 03/04/2009       Preop Vitals  BP Readings from Last 3 Encounters:   12/27/19 124/59   12/06/19 120/60   10/30/19 112/64    Pulse Readings from Last 3 Encounters:   12/06/19 68   10/30/19 80   10/29/19 82      Resp Readings from Last 3 Encounters:   12/27/19 14   04/23/19 16   06/18/12 16    SpO2 Readings from Last 3 Encounters:   12/27/19 96%   12/06/19 98%   10/30/19 96%      Temp Readings from Last 1 Encounters:   12/27/19 36.7  C (98  F) (Oral)    Ht Readings from Last 1 Encounters:   12/27/19 1.822 m (5' 11.75\")      Wt Readings from Last 1 Encounters:   12/27/19 86.8 kg (191 lb 6 oz)    Estimated body mass index is 26.14 kg/m  as calculated from the following:    Height as of this encounter: 1.822 m (5' 11.75\").    Weight as of this encounter: 86.8 kg (191 lb 6 oz).       Anesthesia Plan      History & Physical Review  History and physical reviewed and following examination; no interval change.    ASA Status:  3 .    NPO Status:  > 8 hours    Plan for MAC Reason for MAC:  Deep or markedly invasive procedure (G8)  PONV prophylaxis:  Ondansetron (or other 5HT-3)       Postoperative Care      Consents  Anesthetic plan, risks, benefits and alternatives discussed with:  " Patient..                 Jaswinder Gaytan MD

## 2019-12-27 NOTE — OP NOTE
DATE OF PROCEDURE:  December 27, 2019      SURGEON:  Pradip Sosa MD      PREOPERATIVE DIAGNOSIS:  lung cancer      POSTOPERATIVE DIAGNOSIS:  Same       PROCEDURE:  Placement of [place] PowerPort implantable port, left subclavian vein.       ANESTHESIA:  Local with lidocaine 1% without epinephrine and sedation.       INDICATIONS:  Patient will undergo chemotherapy and a PowerPort is indicated for venous access.       DESCRIPTION OF PROCEDURE:  The patient was brought to the OR and placed in supine position.  The patient was placed into Trendelenburg.  IV sedation was given.  The neck and upper chest were prepared and draped in the usual fashion using ChloraPrep.  Local anesthesia was performed with lidocaine 1% without epinephrine. The left subclavian vein was punctured easily with a 16-gauge needle on the first attempt.  There was excellent blood return.  A guidewire was advanced through the needle without any resistance.  The needle was removed.  A transverse incision was made along the guidewire.  Subcutaneous pocket was made inferior to the incision.  Hemostasis was verified and was excellent.  An introducer with a peel-away sheath was introduced into the vein over the guidewire.  Guidewire and introducer were then removed.  The catheter was advanced through the peel-away sheath without resistance. The peel-away sheath was removed.  The catheter was placed at 20 cm at the skin level.  The catheter was cut and connected to the port.  The port was placed in the subcutaneous pocket.  The port was accessed with a non-coring needle.  There was excellent blood return, PowerPort was finally flushed with 10 mL of solution of heparin flush.  The incision was closed in the usual fashion.  A chest x-ray performed in the operating room and showed the catheter was in excellent position and the PowerPort is ready to be used.           PRADIP SOSA MD

## 2019-12-27 NOTE — ANESTHESIA CARE TRANSFER NOTE
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    Anesthesia Type:   MAC     Note:  Airway :Room Air  Patient transferred to:PACU  Handoff Report: Identifed the Patient, Identified the Reponsible Provider, Reviewed the pertinent medical history, Discussed the surgical course, Reviewed Intra-OP anesthesia mangement and issues during anesthesia, Set expectations for post-procedure period and Allowed opportunity for questions and acknowledgement of understanding      Vitals: (Last set prior to Anesthesia Care Transfer)    CRNA VITALS  12/27/2019 1239 - 12/27/2019 1314      12/27/2019             Pulse:  66    SpO2:  100 %    Resp Rate (set):  10                Electronically Signed By: CHLOÉ Duke CRNA  December 27, 2019  1:14 PM

## 2020-01-01 ENCOUNTER — APPOINTMENT (OUTPATIENT)
Dept: CARDIOLOGY | Facility: CLINIC | Age: 70
DRG: 308 | End: 2020-01-01
Attending: HOSPITALIST
Payer: COMMERCIAL

## 2020-01-01 ENCOUNTER — HOSPITAL ENCOUNTER (OUTPATIENT)
Dept: CARDIOLOGY | Facility: CLINIC | Age: 70
Discharge: HOME | End: 2020-11-05
Attending: INTERNAL MEDICINE | Admitting: INTERNAL MEDICINE
Payer: COMMERCIAL

## 2020-01-01 ENCOUNTER — TRANSFERRED RECORDS (OUTPATIENT)
Dept: HEALTH INFORMATION MANAGEMENT | Facility: CLINIC | Age: 70
End: 2020-01-01

## 2020-01-01 ENCOUNTER — HOSPITAL ENCOUNTER (INPATIENT)
Facility: CLINIC | Age: 70
LOS: 1 days | Discharge: HOME | DRG: 308 | End: 2020-12-14
Attending: EMERGENCY MEDICINE | Admitting: HOSPITALIST
Payer: COMMERCIAL

## 2020-01-01 ENCOUNTER — TELEPHONE (OUTPATIENT)
Dept: FAMILY MEDICINE | Facility: CLINIC | Age: 70
End: 2020-01-01

## 2020-01-01 ENCOUNTER — APPOINTMENT (OUTPATIENT)
Dept: CT IMAGING | Facility: CLINIC | Age: 70
DRG: 308 | End: 2020-01-01
Attending: EMERGENCY MEDICINE
Payer: COMMERCIAL

## 2020-01-01 ENCOUNTER — APPOINTMENT (OUTPATIENT)
Dept: GENERAL RADIOLOGY | Facility: CLINIC | Age: 70
DRG: 308 | End: 2020-01-01
Attending: EMERGENCY MEDICINE
Payer: COMMERCIAL

## 2020-01-01 ENCOUNTER — MEDICAL CORRESPONDENCE (OUTPATIENT)
Dept: HEALTH INFORMATION MANAGEMENT | Facility: CLINIC | Age: 70
End: 2020-01-01

## 2020-01-01 VITALS
HEIGHT: 72 IN | DIASTOLIC BLOOD PRESSURE: 54 MMHG | SYSTOLIC BLOOD PRESSURE: 104 MMHG | BODY MASS INDEX: 26.7 KG/M2 | WEIGHT: 197.1 LBS | RESPIRATION RATE: 16 BRPM | OXYGEN SATURATION: 96 % | HEART RATE: 90 BPM | TEMPERATURE: 98.9 F

## 2020-01-01 DIAGNOSIS — C34.90 LUNG CANCER (H): ICD-10-CM

## 2020-01-01 DIAGNOSIS — I48.92 ATRIAL FLUTTER WITH RAPID VENTRICULAR RESPONSE (H): ICD-10-CM

## 2020-01-01 DIAGNOSIS — I10 BENIGN ESSENTIAL HYPERTENSION: ICD-10-CM

## 2020-01-01 DIAGNOSIS — C34.90 LUNG CANCER (H): Primary | ICD-10-CM

## 2020-01-01 LAB
ALBUMIN SERPL-MCNC: 2.4 G/DL (ref 3.4–5)
ALP SERPL-CCNC: 28 U/L (ref 40–150)
ALT SERPL W P-5'-P-CCNC: 18 U/L (ref 0–70)
ANION GAP SERPL CALCULATED.3IONS-SCNC: 11 MMOL/L (ref 3–14)
ANION GAP SERPL CALCULATED.3IONS-SCNC: 9 MMOL/L (ref 3–14)
AST SERPL W P-5'-P-CCNC: 25 U/L (ref 0–45)
BASOPHILS # BLD AUTO: 0 10E9/L (ref 0–0.2)
BASOPHILS NFR BLD AUTO: 1 %
BILIRUB SERPL-MCNC: 0.4 MG/DL (ref 0.2–1.3)
BUN SERPL-MCNC: 10 MG/DL (ref 7–30)
BUN SERPL-MCNC: 12 MG/DL (ref 7–30)
CALCIUM SERPL-MCNC: 7.7 MG/DL (ref 8.5–10.1)
CALCIUM SERPL-MCNC: 8.3 MG/DL (ref 8.5–10.1)
CHLORIDE SERPL-SCNC: 100 MMOL/L (ref 94–109)
CHLORIDE SERPL-SCNC: 102 MMOL/L (ref 94–109)
CHOLEST SERPL-MCNC: 99 MG/DL
CO2 SERPL-SCNC: 20 MMOL/L (ref 20–32)
CO2 SERPL-SCNC: 22 MMOL/L (ref 20–32)
CREAT SERPL-MCNC: 0.82 MG/DL (ref 0.66–1.25)
CREAT SERPL-MCNC: 0.88 MG/DL (ref 0.66–1.25)
D DIMER PPP FEU-MCNC: 2.6 UG/ML FEU (ref 0–0.5)
DIFFERENTIAL METHOD BLD: ABNORMAL
EOSINOPHIL # BLD AUTO: 0 10E9/L (ref 0–0.7)
EOSINOPHIL NFR BLD AUTO: 1 %
ERYTHROCYTE [DISTWIDTH] IN BLOOD BY AUTOMATED COUNT: 11.9 % (ref 10–15)
ERYTHROCYTE [DISTWIDTH] IN BLOOD BY AUTOMATED COUNT: 12 % (ref 10–15)
FLUAV+FLUBV RNA SPEC QL NAA+PROBE: NEGATIVE
FLUAV+FLUBV RNA SPEC QL NAA+PROBE: NEGATIVE
GFR SERPL CREATININE-BSD FRML MDRD: 86 ML/MIN/{1.73_M2}
GFR SERPL CREATININE-BSD FRML MDRD: 89 ML/MIN/{1.73_M2}
GLUCOSE BLDC GLUCOMTR-MCNC: 104 MG/DL (ref 70–99)
GLUCOSE BLDC GLUCOMTR-MCNC: 74 MG/DL (ref 70–99)
GLUCOSE BLDC GLUCOMTR-MCNC: 80 MG/DL (ref 70–99)
GLUCOSE SERPL-MCNC: 63 MG/DL (ref 70–99)
GLUCOSE SERPL-MCNC: 87 MG/DL (ref 70–99)
HCT VFR BLD AUTO: 35 % (ref 40–53)
HCT VFR BLD AUTO: 37.6 % (ref 40–53)
HDLC SERPL-MCNC: 30 MG/DL
HGB BLD-MCNC: 11.2 G/DL (ref 13.3–17.7)
HGB BLD-MCNC: 12.2 G/DL (ref 13.3–17.7)
INTERPRETATION ECG - MUSE: NORMAL
LABORATORY COMMENT REPORT: NORMAL
LDLC SERPL CALC-MCNC: 46 MG/DL
LYMPHOCYTES # BLD AUTO: 1.9 10E9/L (ref 0.8–5.3)
LYMPHOCYTES NFR BLD AUTO: 70 %
MAGNESIUM SERPL-MCNC: 1.8 MG/DL (ref 1.6–2.3)
MCH RBC QN AUTO: 29.6 PG (ref 26.5–33)
MCH RBC QN AUTO: 29.6 PG (ref 26.5–33)
MCHC RBC AUTO-ENTMCNC: 32 G/DL (ref 31.5–36.5)
MCHC RBC AUTO-ENTMCNC: 32.4 G/DL (ref 31.5–36.5)
MCV RBC AUTO: 91 FL (ref 78–100)
MCV RBC AUTO: 92 FL (ref 78–100)
MONOCYTES # BLD AUTO: 0.7 10E9/L (ref 0–1.3)
MONOCYTES NFR BLD AUTO: 25 %
NEUTROPHILS # BLD AUTO: 0.1 10E9/L (ref 1.6–8.3)
NEUTROPHILS NFR BLD AUTO: 3 %
NONHDLC SERPL-MCNC: 69 MG/DL
PLATELET # BLD AUTO: 248 10E9/L (ref 150–450)
PLATELET # BLD AUTO: 284 10E9/L (ref 150–450)
PLATELET # BLD EST: ABNORMAL 10*3/UL
POTASSIUM SERPL-SCNC: 3.9 MMOL/L (ref 3.4–5.3)
POTASSIUM SERPL-SCNC: 3.9 MMOL/L (ref 3.4–5.3)
PROT SERPL-MCNC: 6 G/DL (ref 6.8–8.8)
RBC # BLD AUTO: 3.79 10E12/L (ref 4.4–5.9)
RBC # BLD AUTO: 4.12 10E12/L (ref 4.4–5.9)
RBC MORPH BLD: ABNORMAL
RSV RNA SPEC QL NAA+PROBE: NEGATIVE
SARS-COV-2 RNA SPEC QL NAA+PROBE: NEGATIVE
SODIUM SERPL-SCNC: 131 MMOL/L (ref 133–144)
SODIUM SERPL-SCNC: 133 MMOL/L (ref 133–144)
SPECIMEN SOURCE: NORMAL
TRIGL SERPL-MCNC: 115 MG/DL
TROPONIN I SERPL-MCNC: <0.015 UG/L (ref 0–0.04)
TROPONIN I SERPL-MCNC: <0.015 UG/L (ref 0–0.04)
TSH SERPL DL<=0.005 MIU/L-ACNC: 2.96 MU/L (ref 0.4–4)
WBC # BLD AUTO: 2.5 10E9/L (ref 4–11)
WBC # BLD AUTO: 2.7 10E9/L (ref 4–11)

## 2020-01-01 PROCEDURE — 85027 COMPLETE CBC AUTOMATED: CPT | Performed by: HOSPITALIST

## 2020-01-01 PROCEDURE — 250N000011 HC RX IP 250 OP 636: Performed by: EMERGENCY MEDICINE

## 2020-01-01 PROCEDURE — 93005 ELECTROCARDIOGRAM TRACING: CPT

## 2020-01-01 PROCEDURE — 96366 THER/PROPH/DIAG IV INF ADDON: CPT

## 2020-01-01 PROCEDURE — 83735 ASSAY OF MAGNESIUM: CPT | Performed by: HOSPITALIST

## 2020-01-01 PROCEDURE — 99285 EMERGENCY DEPT VISIT HI MDM: CPT | Mod: 25

## 2020-01-01 PROCEDURE — 71275 CT ANGIOGRAPHY CHEST: CPT

## 2020-01-01 PROCEDURE — 210N000002 HC R&B HEART CARE

## 2020-01-01 PROCEDURE — 999N000208 ECHOCARDIOGRAM COMPLETE

## 2020-01-01 PROCEDURE — 80048 BASIC METABOLIC PNL TOTAL CA: CPT | Performed by: EMERGENCY MEDICINE

## 2020-01-01 PROCEDURE — 200N000001 HC R&B ICU

## 2020-01-01 PROCEDURE — 96376 TX/PRO/DX INJ SAME DRUG ADON: CPT

## 2020-01-01 PROCEDURE — 96365 THER/PROPH/DIAG IV INF INIT: CPT

## 2020-01-01 PROCEDURE — 250N000013 HC RX MED GY IP 250 OP 250 PS 637: Performed by: HOSPITALIST

## 2020-01-01 PROCEDURE — 258N000003 HC RX IP 258 OP 636: Performed by: EMERGENCY MEDICINE

## 2020-01-01 PROCEDURE — C9803 HOPD COVID-19 SPEC COLLECT: HCPCS

## 2020-01-01 PROCEDURE — 84484 ASSAY OF TROPONIN QUANT: CPT | Performed by: HOSPITALIST

## 2020-01-01 PROCEDURE — 99239 HOSP IP/OBS DSCHRG MGMT >30: CPT | Performed by: HOSPITALIST

## 2020-01-01 PROCEDURE — 84443 ASSAY THYROID STIM HORMONE: CPT | Performed by: EMERGENCY MEDICINE

## 2020-01-01 PROCEDURE — 80061 LIPID PANEL: CPT | Performed by: HOSPITALIST

## 2020-01-01 PROCEDURE — 80053 COMPREHEN METABOLIC PANEL: CPT | Performed by: HOSPITALIST

## 2020-01-01 PROCEDURE — 93005 ELECTROCARDIOGRAM TRACING: CPT | Mod: 76

## 2020-01-01 PROCEDURE — 93010 ELECTROCARDIOGRAM REPORT: CPT | Performed by: INTERNAL MEDICINE

## 2020-01-01 PROCEDURE — 250N000011 HC RX IP 250 OP 636: Performed by: HOSPITALIST

## 2020-01-01 PROCEDURE — 999N001017 HC STATISTIC GLUCOSE BY METER IP

## 2020-01-01 PROCEDURE — 99223 1ST HOSP IP/OBS HIGH 75: CPT | Mod: AI | Performed by: HOSPITALIST

## 2020-01-01 PROCEDURE — 84484 ASSAY OF TROPONIN QUANT: CPT | Performed by: EMERGENCY MEDICINE

## 2020-01-01 PROCEDURE — 71045 X-RAY EXAM CHEST 1 VIEW: CPT

## 2020-01-01 PROCEDURE — 85025 COMPLETE CBC W/AUTO DIFF WBC: CPT | Performed by: EMERGENCY MEDICINE

## 2020-01-01 PROCEDURE — 99221 1ST HOSP IP/OBS SF/LOW 40: CPT | Mod: 25 | Performed by: INTERNAL MEDICINE

## 2020-01-01 PROCEDURE — 93306 TTE W/DOPPLER COMPLETE: CPT | Mod: 26 | Performed by: INTERNAL MEDICINE

## 2020-01-01 PROCEDURE — 85379 FIBRIN DEGRADATION QUANT: CPT | Performed by: EMERGENCY MEDICINE

## 2020-01-01 PROCEDURE — 87636 SARSCOV2 & INF A&B AMP PRB: CPT | Performed by: EMERGENCY MEDICINE

## 2020-01-01 PROCEDURE — 255N000002 HC RX 255 OP 636: Performed by: HOSPITALIST

## 2020-01-01 PROCEDURE — 250N000009 HC RX 250: Performed by: EMERGENCY MEDICINE

## 2020-01-01 RX ORDER — BISACODYL 10 MG
10 SUPPOSITORY, RECTAL RECTAL DAILY PRN
Status: DISCONTINUED | OUTPATIENT
Start: 2020-01-01 | End: 2020-01-01 | Stop reason: HOSPADM

## 2020-01-01 RX ORDER — ASPIRIN 81 MG/1
81 TABLET ORAL DAILY
Status: DISCONTINUED | OUTPATIENT
Start: 2020-01-01 | End: 2020-01-01 | Stop reason: HOSPADM

## 2020-01-01 RX ORDER — OXYCODONE HYDROCHLORIDE 5 MG/1
5-10 TABLET ORAL
Status: DISCONTINUED | OUTPATIENT
Start: 2020-01-01 | End: 2020-01-01 | Stop reason: HOSPADM

## 2020-01-01 RX ORDER — NALOXONE HYDROCHLORIDE 0.4 MG/ML
0.2 INJECTION, SOLUTION INTRAMUSCULAR; INTRAVENOUS; SUBCUTANEOUS
Status: DISCONTINUED | OUTPATIENT
Start: 2020-01-01 | End: 2020-01-01 | Stop reason: HOSPADM

## 2020-01-01 RX ORDER — METOPROLOL TARTRATE 25 MG/1
12.5 TABLET, FILM COATED ORAL 2 TIMES DAILY
Qty: 30 TABLET | Refills: 0 | Status: SHIPPED | OUTPATIENT
Start: 2020-01-01 | End: 2021-01-01

## 2020-01-01 RX ORDER — HEPARIN SODIUM,PORCINE 10 UNIT/ML
5-10 VIAL (ML) INTRAVENOUS
Status: DISCONTINUED | OUTPATIENT
Start: 2020-01-01 | End: 2020-01-01 | Stop reason: HOSPADM

## 2020-01-01 RX ORDER — ACETAMINOPHEN 325 MG/1
650 TABLET ORAL EVERY 4 HOURS PRN
Status: DISCONTINUED | OUTPATIENT
Start: 2020-01-01 | End: 2020-01-01 | Stop reason: HOSPADM

## 2020-01-01 RX ORDER — NALOXONE HYDROCHLORIDE 0.4 MG/ML
0.4 INJECTION, SOLUTION INTRAMUSCULAR; INTRAVENOUS; SUBCUTANEOUS
Status: DISCONTINUED | OUTPATIENT
Start: 2020-01-01 | End: 2020-01-01 | Stop reason: HOSPADM

## 2020-01-01 RX ORDER — LISINOPRIL 10 MG/1
10 TABLET ORAL DAILY
Qty: 90 TABLET | Refills: 1
Start: 2021-01-01 | End: 2021-01-01

## 2020-01-01 RX ORDER — LIDOCAINE 40 MG/G
CREAM TOPICAL
Status: DISCONTINUED | OUTPATIENT
Start: 2020-01-01 | End: 2020-01-01 | Stop reason: HOSPADM

## 2020-01-01 RX ORDER — HEPARIN SODIUM,PORCINE 10 UNIT/ML
5-10 VIAL (ML) INTRAVENOUS EVERY 24 HOURS
Status: DISCONTINUED | OUTPATIENT
Start: 2020-01-01 | End: 2020-01-01 | Stop reason: HOSPADM

## 2020-01-01 RX ORDER — IOPAMIDOL 755 MG/ML
73 INJECTION, SOLUTION INTRAVASCULAR ONCE
Status: COMPLETED | OUTPATIENT
Start: 2020-01-01 | End: 2020-01-01

## 2020-01-01 RX ORDER — ONDANSETRON 2 MG/ML
4 INJECTION INTRAMUSCULAR; INTRAVENOUS EVERY 6 HOURS PRN
Status: DISCONTINUED | OUTPATIENT
Start: 2020-01-01 | End: 2020-01-01 | Stop reason: HOSPADM

## 2020-01-01 RX ORDER — HEPARIN SODIUM (PORCINE) LOCK FLUSH IV SOLN 100 UNIT/ML 100 UNIT/ML
5 SOLUTION INTRAVENOUS
Status: DISCONTINUED | OUTPATIENT
Start: 2020-01-01 | End: 2020-01-01 | Stop reason: HOSPADM

## 2020-01-01 RX ORDER — GUAIFENESIN 600 MG/1
600 TABLET, EXTENDED RELEASE ORAL 2 TIMES DAILY
Status: DISCONTINUED | OUTPATIENT
Start: 2020-01-01 | End: 2020-01-01 | Stop reason: HOSPADM

## 2020-01-01 RX ORDER — DILTIAZEM HYDROCHLORIDE 5 MG/ML
5 INJECTION INTRAVENOUS ONCE
Status: COMPLETED | OUTPATIENT
Start: 2020-01-01 | End: 2020-01-01

## 2020-01-01 RX ORDER — ONDANSETRON 4 MG/1
4 TABLET, ORALLY DISINTEGRATING ORAL EVERY 6 HOURS PRN
Status: DISCONTINUED | OUTPATIENT
Start: 2020-01-01 | End: 2020-01-01 | Stop reason: HOSPADM

## 2020-01-01 RX ADMIN — Medication 5 ML: at 03:43

## 2020-01-01 RX ADMIN — APIXABAN 5 MG: 5 TABLET, FILM COATED ORAL at 17:37

## 2020-01-01 RX ADMIN — Medication 5 ML: at 05:37

## 2020-01-01 RX ADMIN — IOPAMIDOL 73 ML: 755 INJECTION, SOLUTION INTRAVENOUS at 21:03

## 2020-01-01 RX ADMIN — HUMAN ALBUMIN MICROSPHERES AND PERFLUTREN 9 ML: 10; .22 INJECTION, SOLUTION INTRAVENOUS at 09:37

## 2020-01-01 RX ADMIN — HEPARIN SODIUM (PORCINE) LOCK FLUSH IV SOLN 100 UNIT/ML 5 ML: 100 SOLUTION at 16:20

## 2020-01-01 RX ADMIN — DILTIAZEM HYDROCHLORIDE 5 MG/HR: 5 INJECTION INTRAVENOUS at 23:25

## 2020-01-01 RX ADMIN — DILTIAZEM HYDROCHLORIDE 5 MG: 5 INJECTION INTRAVENOUS at 22:15

## 2020-01-01 RX ADMIN — METOPROLOL TARTRATE 12.5 MG: 25 TABLET, FILM COATED ORAL at 08:04

## 2020-01-01 RX ADMIN — Medication 5 ML: at 09:57

## 2020-01-01 RX ADMIN — ENOXAPARIN SODIUM 40 MG: 40 INJECTION SUBCUTANEOUS at 05:22

## 2020-01-01 RX ADMIN — SODIUM CHLORIDE 96 ML: 9 INJECTION, SOLUTION INTRAVENOUS at 21:03

## 2020-01-01 RX ADMIN — ASPIRIN 81 MG: 81 TABLET ORAL at 05:22

## 2020-01-01 RX ADMIN — GUAIFENESIN 600 MG: 600 TABLET, EXTENDED RELEASE ORAL at 13:13

## 2020-01-01 ASSESSMENT — MIFFLIN-ST. JEOR
SCORE: 1687.17
SCORE: 1700.19

## 2020-01-01 ASSESSMENT — ACTIVITIES OF DAILY LIVING (ADL)
ADLS_ACUITY_SCORE: 12

## 2020-01-01 ASSESSMENT — ENCOUNTER SYMPTOMS
ABDOMINAL PAIN: 0
SHORTNESS OF BREATH: 1
RHINORRHEA: 1

## 2020-01-27 ENCOUNTER — TRANSFERRED RECORDS (OUTPATIENT)
Dept: HEALTH INFORMATION MANAGEMENT | Facility: CLINIC | Age: 70
End: 2020-01-27

## 2020-03-02 ENCOUNTER — TRANSFERRED RECORDS (OUTPATIENT)
Dept: HEALTH INFORMATION MANAGEMENT | Facility: CLINIC | Age: 70
End: 2020-03-02

## 2020-03-02 ENCOUNTER — TELEPHONE (OUTPATIENT)
Dept: OTHER | Facility: CLINIC | Age: 70
End: 2020-03-02

## 2020-03-02 NOTE — TELEPHONE ENCOUNTER
Pt hx of AAA repair with hemashield graft for ruptured infrarenal AAA on 3/5/2009.     Shannon from [facility], Dr. Gamble's office, called to inquire if pts PET scan done 2-28-20 (with noted AAA results) will suffice for OV follow up with Dr. Osuna, or if pt still needs US aorta/ivc/iliac duplex complete.     Most likely pt will still need recommended imaging as PET scan will not obtain measurements and lower extremity imaging needed for vascular purposes.   I called to obtain PET scan image and report, the image was done through [facility] so they only have report.   Report to be faxed to us and will need imaging.      Report received via fax from [facility].    Discussed with our imaging tech.     I called pt and explained will need imaging as previously ordered.     Mary Grace Bradshaw, BSN, RN  [facility]

## 2020-03-04 ENCOUNTER — HOSPITAL ENCOUNTER (OUTPATIENT)
Dept: ULTRASOUND IMAGING | Facility: CLINIC | Age: 70
End: 2020-03-04
Attending: SURGERY
Payer: COMMERCIAL

## 2020-03-04 ENCOUNTER — OFFICE VISIT (OUTPATIENT)
Dept: OTHER | Facility: CLINIC | Age: 70
End: 2020-03-04
Attending: SURGERY
Payer: COMMERCIAL

## 2020-03-04 VITALS — HEART RATE: 68 BPM | SYSTOLIC BLOOD PRESSURE: 131 MMHG | DIASTOLIC BLOOD PRESSURE: 68 MMHG

## 2020-03-04 DIAGNOSIS — Z86.79 S/P AAA REPAIR: ICD-10-CM

## 2020-03-04 DIAGNOSIS — I72.4 BILATERAL POPLITEAL ARTERY ANEURYSM (H): ICD-10-CM

## 2020-03-04 DIAGNOSIS — Z86.79 S/P ANEURYSM REPAIR: ICD-10-CM

## 2020-03-04 DIAGNOSIS — I71.40 AAA (ABDOMINAL AORTIC ANEURYSM) (H): ICD-10-CM

## 2020-03-04 DIAGNOSIS — Z98.890 S/P AAA REPAIR USING STRAIGHT GRAFT: Primary | ICD-10-CM

## 2020-03-04 DIAGNOSIS — Z98.890 S/P AAA REPAIR: ICD-10-CM

## 2020-03-04 DIAGNOSIS — Z86.79 S/P AAA REPAIR USING STRAIGHT GRAFT: Primary | ICD-10-CM

## 2020-03-04 DIAGNOSIS — Z98.890 S/P ANEURYSM REPAIR: ICD-10-CM

## 2020-03-04 PROCEDURE — 99213 OFFICE O/P EST LOW 20 MIN: CPT | Mod: ZP | Performed by: SURGERY

## 2020-03-04 PROCEDURE — 93978 VASCULAR STUDY: CPT

## 2020-03-04 PROCEDURE — G0463 HOSPITAL OUTPT CLINIC VISIT: HCPCS

## 2020-03-04 PROCEDURE — 93925 LOWER EXTREMITY STUDY: CPT

## 2020-03-04 NOTE — NURSING NOTE
"Luis Stapleton is a 69 year old male who presents for:  Chief Complaint   Patient presents with     RECHECK     BLE Art, A/I US (9:00 VHC, 10:45 TJG) History of open AAA repair 3/2009; prominent R popliteal pulse; 5 yr f/u to 2/24/15 appt with Dr. Osuna.        Vitals:    Vitals:    03/04/20 1029   BP: 131/68   BP Location: Left arm   Patient Position: Chair   Cuff Size: Adult Regular   Pulse: 68       BMI:  Estimated body mass index is 26.14 kg/m  as calculated from the following:    Height as of 12/27/19: 5' 11.75\" (1.822 m).    Weight as of 12/27/19: 191 lb 6 oz (86.8 kg).    Pain Score:  Data Unavailable        Myrna Aly MA    "

## 2020-03-06 NOTE — PROGRESS NOTES
Luis Stapleton is a 69-year-old gentleman who is 11 years status post open repair of a ruptured AAA using a tube graft.  He presents today for 5-year ultrasound follow-up.  He remains tobacco free.  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  He was started on chemo immunotherapy which I believe is on hold due to some hematologic complications.  At today's visit he has no vascular complaints.    Exam:  Well-developed male in no acute distress.  Blood pressure 131/68 with a pulse of 68.  Well-healed midline abdominal incision with no evidence of hernia.  Nontender.  Palpable pedal pulses.    Imaging:  ULTRASOUND AORTA/IVC/ILIAC DUPLEX COMPLETE  3/4/2020 9:58 AM     HISTORY: Open abdominal aortic aneurysm repair 3/4/2009. Status post  AAA repair.     COMPARISON: 2/25/2015     FINDINGS: Changes of open abdominal aortic repair. The graft is  patent. Diameters range between 2.1 and 2.6 mm. Inflow and outflow  arteries are patent.                                                                       IMPRESSION: Aortic graft is widely patent.     NIKOLAI LOUISE DO    ULTRASOUND BILATERAL LOWER EXTREMITY ARTERIAL DUPLEX   3/4/2020 9:58  AM      HISTORY: HIstory of open aortic aneurysm repair 3/2009. Prominent  right popliteal pulse. Status post aneurysm repair.     COMPARISON: 2/25/2015     FINDINGS: Color Doppler and spectral waveform analysis was performed  throughout the bilateral popliteal arteries.     Right: Mild aneurysmal dilatation of the at-knee popliteal artery  measuring 1.0 x 1.2 cm.     Left: Aneurysmal dilatation of the left at-knee popliteal artery  measuring 1.0 x 1.0 cm.                                                                      IMPRESSION: Bilateral popliteal artery aneurysms measuring up to 1.0 x  1.2 cm on the right and 1.0 x 1.0 cm on the left.     NIKOLAI LOUISE  DO        ASSESSMENT:  1.  11 years status post open repair of ruptured infrarenal AAA using a tube graft clinically doing well from that standpoint.    2.  Squamous cell carcinoma of the right lung with liver metastasis.    3.  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    RECOMMENDATION:   I reviewed all the above with Jay.  I have no vascular concerns.  His metastatic lung cancer is currently the priority.  Vascular surgical follow-up would be AAA ultrasound and bilateral popliteal artery ultrasounds in 5 years.  All of his questions were answered and he verbalizes full understanding to the above.    Total face-to-face time was 15 minutes, greater than 50% spent providing counseling and education.    Stef Osuna MD

## 2020-03-23 ENCOUNTER — TRANSFERRED RECORDS (OUTPATIENT)
Dept: HEALTH INFORMATION MANAGEMENT | Facility: CLINIC | Age: 70
End: 2020-03-23

## 2020-03-30 ENCOUNTER — TRANSFERRED RECORDS (OUTPATIENT)
Dept: HEALTH INFORMATION MANAGEMENT | Facility: CLINIC | Age: 70
End: 2020-03-30

## 2020-04-13 ENCOUNTER — TRANSFERRED RECORDS (OUTPATIENT)
Dept: HEALTH INFORMATION MANAGEMENT | Facility: CLINIC | Age: 70
End: 2020-04-13

## 2020-05-04 ENCOUNTER — TRANSFERRED RECORDS (OUTPATIENT)
Dept: HEALTH INFORMATION MANAGEMENT | Facility: CLINIC | Age: 70
End: 2020-05-04

## 2020-05-26 ENCOUNTER — TRANSFERRED RECORDS (OUTPATIENT)
Dept: HEALTH INFORMATION MANAGEMENT | Facility: CLINIC | Age: 70
End: 2020-05-26

## 2020-06-15 ENCOUNTER — TRANSFERRED RECORDS (OUTPATIENT)
Dept: HEALTH INFORMATION MANAGEMENT | Facility: CLINIC | Age: 70
End: 2020-06-15

## 2020-06-24 DIAGNOSIS — Z13.6 CARDIOVASCULAR SCREENING; LDL GOAL LESS THAN 100: ICD-10-CM

## 2020-06-24 RX ORDER — SIMVASTATIN 40 MG
TABLET ORAL
Qty: 90 TABLET | Refills: 0 | OUTPATIENT
Start: 2020-06-24

## 2020-06-24 NOTE — TELEPHONE ENCOUNTER
Rx sent to pharmacy by [facility]ce on 6/22/20 for a 1 month supply.    Rx denied to pharmacy    Shayla HIGGINS RN  EP Triage

## 2020-06-30 ENCOUNTER — VIRTUAL VISIT (OUTPATIENT)
Dept: FAMILY MEDICINE | Facility: CLINIC | Age: 70
End: 2020-06-30
Payer: COMMERCIAL

## 2020-06-30 DIAGNOSIS — Z12.5 SCREENING FOR PROSTATE CANCER: ICD-10-CM

## 2020-06-30 DIAGNOSIS — I71.40 ABDOMINAL AORTIC ANEURYSM (AAA) WITHOUT RUPTURE (H): Primary | ICD-10-CM

## 2020-06-30 DIAGNOSIS — I10 BENIGN ESSENTIAL HYPERTENSION: ICD-10-CM

## 2020-06-30 DIAGNOSIS — Z13.6 CARDIOVASCULAR SCREENING; LDL GOAL LESS THAN 100: ICD-10-CM

## 2020-06-30 DIAGNOSIS — C34.31 MALIGNANT NEOPLASM OF LOWER LOBE OF RIGHT LUNG (H): ICD-10-CM

## 2020-06-30 PROCEDURE — 99214 OFFICE O/P EST MOD 30 MIN: CPT | Mod: 95 | Performed by: FAMILY MEDICINE

## 2020-06-30 RX ORDER — LISINOPRIL 10 MG/1
10 TABLET ORAL DAILY
Qty: 90 TABLET | Refills: 1 | Status: ON HOLD | OUTPATIENT
Start: 2020-01-01 | End: 2020-01-01

## 2020-06-30 RX ORDER — AMOXICILLIN 500 MG/1
2000 CAPSULE ORAL ONCE
Qty: 4 CAPSULE | Refills: 3 | Status: SHIPPED | OUTPATIENT
Start: 2020-06-30 | End: 2020-06-30

## 2020-06-30 RX ORDER — SIMVASTATIN 40 MG
TABLET ORAL
Qty: 90 TABLET | Refills: 3 | Status: SHIPPED | OUTPATIENT
Start: 2020-06-30 | End: 2021-01-01

## 2020-06-30 NOTE — PROGRESS NOTES
"Luis Stapleton is a 70 year old male who is being evaluated via a billable video visit.      The patient has been notified of following:     \"This video visit will be conducted via a call between you and your physician/provider. We have found that certain health care needs can be provided without the need for an in-person physical exam.  This service lets us provide the care you need with a video conversation.  If a prescription is necessary we can send it directly to your pharmacy.  If lab work is needed we can place an order for that and you can then stop by our lab to have the test done at a later time.    Video visits are billed at different rates depending on your insurance coverage.  Please reach out to your insurance provider with any questions.    If during the course of the call the physician/provider feels a video visit is not appropriate, you will not be charged for this service.\"    Patient has given verbal consent for Video visit? Yes  How would you like to obtain your AVS? [facility]  Patient would like the video invitation sent by:  My chart   Will anyone else be joining your video visit? No      Subjective     Luis Stapleton is a 70 year old male who presents today via video visit for the following health issues:    HPI  Hyperlipidemia Follow-Up      Are you regularly taking any medication or supplement to lower your cholesterol?   Yes Are you having muscle aches or other side effects that you think could be caused by your cholesterol lowering medication?  No    Hypertension Follow-up      Do you check your blood pressure regularly outside of the clinic? No     Are you following a low salt diet? Yes    Are your blood pressures ever more than 140 on the top number (systolic) OR more   than 90 on the bottom number (diastolic), for example 140/90? No 110/70      How many servings of fruits and vegetables do you eat daily?  2-3    On average, how many sweetened beverages do you drink each day " (Examples: soda, juice, sweet tea, etc.  Do NOT count diet or artificially sweetened beverages)?   0    How many days per week do you exercise enough to make your heart beat faster? 7    How many minutes a day do you exercise enough to make your heart beat faster? 30 - 60    How many days per week do you miss taking your medication?  Sometimes         Video Start Time: 9:25            Reviewed and updated as needed this visit by Provider         Review of Systems   Constitutional, HEENT, cardiovascular, pulmonary, gi and gu systems are negative, except as otherwise noted.      Objective             Physical Exam     GENERAL: Healthy, alert and no distress  EYES: Eyes grossly normal to inspection.  No discharge or erythema, or obvious scleral/conjunctival abnormalities.  RESP: No audible wheeze, cough, or visible cyanosis.  No visible retractions or increased work of breathing.    SKIN: Visible skin clear. No significant rash, abnormal pigmentation or lesions.  NEURO: Cranial nerves grossly intact.  Mentation and speech appropriate for age.  PSYCH: Mentation appears normal, affect normal/bright, judgement and insight intact, normal speech and appearance well-groomed.      Diagnostic Test Results:  Labs reviewed in Epic        Assessment & Plan     1. CARDIOVASCULAR SCREENING; LDL GOAL LESS THAN 100    - simvastatin (ZOCOR) 40 MG tablet; TAKE ONE TABLET BY MOUTH ONCE DAILY AT BEDTIME  Dispense: 90 tablet; Refill: 3  - Lipid panel reflex to direct LDL Fasting; Future    2. Benign essential hypertension    - lisinopril (ZESTRIL) 10 MG tablet; Take 1 tablet (10 mg) by mouth daily  Dispense: 90 tablet; Refill: 1    3. Malignant neoplasm of lower lobe of right lung (H)      4. Abdominal aortic aneurysm (AAA) without rupture (H)    - amoxicillin (AMOXIL) 500 MG capsule; Take 4 capsules (2,000 mg) by mouth once for 1 dose Before dental work  Dispense: 4 capsule; Refill: 3    5. Screening for prostate cancer    - PSA, screen;  "Future     BMI:   Estimated body mass index is 26.14 kg/m  as calculated from the following:    Height as of 12/27/19: 1.822 m (5' 11.75\").    Weight as of 12/27/19: 86.8 kg (191 lb 6 oz).                 Cam Avila MD  [facility] [place]      Video-Visit Details    Type of service:  Video Visit    Video End Time:9:35    Originating Location (pt. Location): Home    Distant Location (provider location):  [facility] [place]     Platform used for Video Visit: ciValue    No follow-ups on file.       Cam Avila MD      "

## 2020-07-03 DIAGNOSIS — Z12.5 SCREENING FOR PROSTATE CANCER: ICD-10-CM

## 2020-07-03 DIAGNOSIS — Z13.6 CARDIOVASCULAR SCREENING; LDL GOAL LESS THAN 100: ICD-10-CM

## 2020-07-03 PROCEDURE — G0103 PSA SCREENING: HCPCS | Performed by: FAMILY MEDICINE

## 2020-07-03 PROCEDURE — 36415 COLL VENOUS BLD VENIPUNCTURE: CPT | Performed by: FAMILY MEDICINE

## 2020-07-03 PROCEDURE — 80061 LIPID PANEL: CPT | Performed by: FAMILY MEDICINE

## 2020-07-05 LAB
CHOLEST SERPL-MCNC: 148 MG/DL
HDLC SERPL-MCNC: 46 MG/DL
LDLC SERPL CALC-MCNC: 82 MG/DL
NONHDLC SERPL-MCNC: 100 MG/DL
PSA SERPL-ACNC: 1.2 UG/L (ref 0–4)
TRIGL SERPL-MCNC: 86 MG/DL

## 2020-07-27 ENCOUNTER — TRANSFERRED RECORDS (OUTPATIENT)
Dept: HEALTH INFORMATION MANAGEMENT | Facility: CLINIC | Age: 70
End: 2020-07-27

## 2020-08-17 ENCOUNTER — TRANSFERRED RECORDS (OUTPATIENT)
Dept: HEALTH INFORMATION MANAGEMENT | Facility: CLINIC | Age: 70
End: 2020-08-17

## 2020-08-26 ENCOUNTER — TRANSFERRED RECORDS (OUTPATIENT)
Dept: HEALTH INFORMATION MANAGEMENT | Facility: CLINIC | Age: 70
End: 2020-08-26

## 2020-12-13 PROBLEM — I48.92 ATRIAL FLUTTER WITH RAPID VENTRICULAR RESPONSE (H): Status: ACTIVE | Noted: 2020-01-01

## 2020-12-14 NOTE — PLAN OF CARE
VSS on RA.  Tele: SR.  Denies pain or shortness of breath.  Up ind.  Echo completed.  Pt discharged to home with wife.  AVS discussed with pt and all questions answered.  Medications given to pt.  Pt stated he would schedule his own PCP and labs.  Clothes, chemo card, shoes and jacket sent with pt.

## 2020-12-14 NOTE — PROGRESS NOTES
[facility]    History and Physical  Hospitalist    Luis Stapleton MRN# [id number]   Age: 70 year old YOB: 1950     Date of Admission:  12/13/2020    Primary care provider: Cam Avila          Assessment and Plan:     Luis Stapleton is a 70 year old  male with medical history of lung cancer presented to the ED with shortness of breath.    Dyspnea on exertion.  Atrial fibrillation with RVR.- New onset   Evaluated by oncologist on 11/23, noted to have leg swelling and ultrasound done at that time was negative for DVT.  Reports leg swelling improved.  Possibly worsening shortness of breath with minimal ambulation. Some congestion in nose.  Using over-the-counter cough medication.  Denies any chest pain.  In ED afebrile, blood pressure 96/51, heart rate 151.  bilateral decreased breath sounds, tachycardia present.  EKG sinus tachycardia with infrequent PVCs, heart rate 152, QTc 460.  Chest x-ray with slightly decreased size of right perihilar mass  CT chest PE protocol -no PE.  TSH 2.96.  D-dimer 2.6.  Troponin x1 undetectable.  Received IV Cardizem and started on Cardizem drip.  Admit to inpatient.  Telemetry monitoring overnight.  Trend troponins.  Echocardiogram for evaluation of heart function.  Continue IV Cardizem drip, wean off as able to.  Will start on oral metoprolol 12.5 mg twice daily.  Hold PTA lisinopril while on Cardizem drip.  We will start on aspirin 81 mg oral daily.  Hold PTA simvastatin pending medication reconciliation by pharmacy.  We will check magnesium, phosphorus, CK levels.  Check hemoglobin A1c, lipid panel.  Cardiology consult requested.    Metastatic squamous cell lung carcinoma stage IV  Follows up with [place] [facility].  Reports has been started on a new chemotherapy agent [study medication] by his oncologist last week.  CT chest With significantly decreased size of the mass in the superior segment of right lower lung lobe since the  previous study on 10/29/2019.  Right hilar lymphadenopathy appears more prominent.  Common hepatic duct dilatation without choledocholithiasis. There   is subtle decreased attenuation in the head of pancreas adjacent to   the common bile duct. A subtle mass in this region is difficult to   exclude but is not definitely seen. Attention to this region on future   exams is recommended. Dynamic MRI of the abdomen/pancreas and MRCP may   be helpful. Report per radiology   Findings discussed with patient, defer to oncology for further imaging.  [place] [facility] consult requested.    Mild hyponatremia in the setting of malignancy.  sodium 131,   Monitor, oral intake as able to.  IV Hydration with normal saline overnight    Chronic anemia due to malignancy and chemotherapy.  Hemoglobin around 12, monitor.    Physical deconditioning in the setting of malignancy.  Consider PT evaluation if any concerns with ambulation prior to discharge.    Rule Out COVID-19 infection  This patient was evaluated during a global COVID-19 pandemic, which necessitated consideration that the patient might be at risk for infection with the SARS-CoV-2 virus that causes COVID-19. Applicable protocols for evaluation were followed during the patient's care. LOW suspicion for infection.   Follow-up COVID-19 PCR test result; if negative no need for isolation   If positive - initiate isolation.    DVT Prophylaxis: Subcutaneous Lovenox.  Code Status: Full code, discussed with patient.    Disposition: Expected discharge in 1 to 2 days pending clinical improvement.  Total time  > 40 minutes. Discussed with patient and ED team.     Angel Stauffer MD          Chief Complaint:     History is obtained from the patient     Luis Stapleton is a 70 year old  male with medical history of lung cancer presented to the ED with shortness of breath.  Evaluated by oncologist on 11/23, noted to have leg swelling and ultrasound done at that time was negative for  DVT.  Reports leg swelling improved.  Reports having shortness of breath at baseline, significantly worse and unable to ambulate across the room without feeling winded.  Concerned about some congestion in nose.  Using over-the-counter cough medication.  Denies any chest pain or palpitations.  Denies any cough or wheezing.  Denies any fever or chills.  Denies any nausea vomiting.  Denies any abdominal pain.  Denies any tingling or numbness or focal weakness.  Denies any exposure to sick contacts or Covid exposure.  Reports has been started on a new chemotherapy agent [study medication] by his oncologist last week.  In ED afebrile, blood pressure 96/51, heart rate 151.  bilateral decreased breath sounds, tachycardia present.  EKG sinus tachycardia with infrequent PVCs, heart rate 152, QTc 460.  Chest x-ray with slightly decreased size of right perihilar mass  CT chest PE protocol -no PE.  With significantly decreased size of the mass in the superior segment of right lower lung lobe since the previous study on 10/29/2019.  Right hilar lymphadenopathy appears more prominent.  Common hepatic duct dilatation without choledocholithiasis. There   is subtle decreased attenuation in the head of pancreas adjacent to   the common bile duct. A subtle mass in this region is difficult to   exclude but is not definitely seen. Attention to this region on future   exams is recommended. Dynamic MRI of the abdomen/pancreas and MRCP may   be helpful. Report per radiology   BC 2.7, hemoglobin 12.2, sodium 131, creatinine 0.88 TSH 2.96.  D-dimer 2.6.  Troponin x1 undetectable.  Received IV Cardizem and started on Cardizem drip.            Review of Systems:     GENERAL: no fever or chills  EENT:  no difficulty swallowing, no hearing difficulty  PULMONARY: see HPI   CARDIAC: no chest pain  GI: No abdominal pain, nausea, vomiting, diarrhea, constipation, black or bloody stools  : No burning/pain with urination  NEURO: No significant  headaches, no dizziness, no loss of consciousness  ENDOCRINE: No excessive thirst  MUSCULOSKELETAL: No new joint pain  SKIN: No skin rashes    Medical History:     Past Medical History:   Diagnosis Date     Aneurysm (H)     ABDOMINAL      Calculus of kidney      Diverticulosis of colon (without mention of hemorrhage)      Hypertension      Lung cancer (H)      Malignant neoplasm of bladder, part unspecified      Other and unspecified hyperlipidemia         Surgical History:      Past Surgical History:   Procedure Laterality Date     ABDOMEN SURGERY      AAA     ABDOMEN SURGERY  12/10/2019    liver biopsies and removal of nodules     BRONCHOSCOPY       CYSTOSCOPY, BIOPSY BLADDER, COMBINED  2012    Procedure: COMBINED CYSTOSCOPY, BIOPSY BLADDER;;  Surgeon: Jonas Young MD;  Location:  OR     CYSTOSCOPY, RETROGRADES, COMBINED  2012    Procedure: COMBINED CYSTOSCOPY, RETROGRADES;  CYSTOSCOPY, BILATERAL RETROGRADES, BLADDER BIOPSY, FULGURATION;  Surgeon: Jonas Young MD;  Location:  OR     CYSTOSCOPY, TRANSURETHRAL RESECTION (TUR) TUMOR BLADDER, COMBINED       HERNIA REPAIR       INSERT PORT VASCULAR ACCESS N/A 2019    Procedure: INSERTION, VASCULAR ACCESS PORT FLAT PLATE X RAY;  Surgeon: Pradip Sosa MD;  Location:  OR     REPAIR ANEURYSM ABDOMINAL AORTA                   Social History:      Social History     Tobacco Use     Smoking status: Former Smoker     Packs/day: 1.00     Years: 45.00     Pack years: 45.00     Quit date: 3/4/2008     Years since quittin.7     Smokeless tobacco: Never Used   Substance Use Topics     Alcohol use: Yes     Comment: .5 OZ WEEK             Family History:   Family history reviewed, no pertinent family history to HPI.         Allergies:   No Known Allergies          Medications:   Home meds reviewed          Physical Exam      Admission Weight: 90.7 kg (200 lb)  Current Weight: 90.7 kg (200 lb)    Vital Signs with Ranges  Temp:   [98.4  F (36.9  C)] 98.4  F (36.9  C)  Pulse:  [133-154] 133  Resp:  [11-25] 17  BP: ()/(59-87) 104/67  SpO2:  [91 %-98 %] 97 %    PHYSICAL EXAM  GENERAL: Patient is in no distress. Alert and oriented.  HEENT: Oropharynx pink  HEART: Regular rate and rhythm. S1S2. Tachycardia   LUNGS: Bilateral decreased breath sounds, no wheezing.  Respirations unlabored  ABDOMEN: Soft, no abdominal tenderness, bowel sounds heard   NEURO: Moving all extremities, no focal weakness.  EXTREMITIES: No pedal edema. 2+ peripheral pulses.  SKIN: Warm, dry. No rash or bruising.  PSYCHIATRY Cooperative         Data:   All new lab and imaging data was reviewed.

## 2020-12-14 NOTE — ED PROVIDER NOTES
History     Chief Complaint:  Shortness of Breath    HPI  Luis Stapleton is a 70 year old male with history of lung cancer with chronic shortness of breath who presents for evaluation of shortness of breath. Patient was seen on 11/23 by his Onocologist. He had leg swelling at that time and had a ultrasound done that resulted as negative. His leg swelling has completley resolved since. The patient reports that yesterday he was feeling normal but today he has gradually worsening shortness of breath and it is to the point that he is unable to walk across a room without feeling winded. He states that along with this he has had some mild rhinorrhea but denies any known COVID exposures. Patient states he was unaware that his heart is racing as well. He denies any current use of blood thinners, chest pain, abdominal pain, and any other known symptoms or concerns at this time. It is of note patient has been on study medication for last week, using OTC cough medications, and doesn't drink heavily.     Allergies:  No Known Allergies    Medications:    Zestril  Zocor  Valium  Senokot  Vitamin D    Past Medical History:    Aneurysm  Calculus of kidney   Diverticulosis of colon   Hypertension   Lung cancer   Malignant neoplasm of bladder  AAA without rupture  Cardiovascular screening; LDL goal less than  100    Past Surgical History:    AAA Abdomen surgery  Liver biopses and removal of nodules  Bronchoscopy  Cystoscopy, Biopsy bladder, combined  Cystoscopy, retrogrades, combined  Cystoscopy, transurethral resection  Hernia repair  Insert port vascular access    Family History:    No past pertinent family history.    Social History:  The patient presents to the ED alone.   Tobacco Use: Former  Alcohol Use: Yes  Drug Use: Never  Marital Status:   PCP: Cam Avila    Review of Systems   HENT: Positive for rhinorrhea.    Respiratory: Positive for shortness of breath.    Cardiovascular: Negative for chest pain and leg  swelling.   Gastrointestinal: Negative for abdominal pain.   All other systems reviewed and are negative.      Physical Exam     Patient Vitals for the past 24 hrs:   BP Temp Temp src Pulse Resp SpO2 Height Weight   12/13/20 2308 -- -- -- 133 17 97 % -- --   12/13/20 2200 104/67 -- -- 149 23 97 % -- --   12/13/20 2145 -- -- -- 151 22 91 % -- --   12/13/20 2130 110/87 -- -- 149 21 93 % -- --   12/13/20 2115 97/71 -- -- 147 17 92 % -- --   12/13/20 2045 -- -- -- 152 11 -- -- --   12/13/20 2030 93/69 -- -- 152 25 97 % -- --   12/13/20 2015 -- -- -- 154 23 95 % -- --   12/13/20 2005 -- -- -- 151 19 98 % -- --   12/13/20 2000 118/81 -- -- 149 25 94 % -- --   12/13/20 1950 -- -- -- 149 18 94 % -- --   12/13/20 1940 96/59 98.4  F (36.9  C) Oral 151 24 95 % -- --   12/13/20 1939 -- -- -- -- -- -- 1.829 m (6') 90.7 kg (200 lb)       Physical Exam   General: Alert and cooperative with exam. Patient in mild distress. Normal mentation.  Head:  Scalp is NC/AT  Eyes:  No scleral icterus, PERRL  ENT:  The external nose and ears are normal. The oropharynx is normal and without erythema; mucus membranes are moist. Uvula midline, no evidence of deep space infection.  Neck:  Normal range of motion without rigidity.  CV:  Tachycardic and regular rhythm    Port Present.   Resp:  Breath sounds are clear bilaterally    Non-labored, no retractions or accessory muscle use  GI:  Abdomen is soft, no distension, no tenderness. No peritoneal signs  MS:  No lower extremity edema   Skin:  Warm and dry, No rash or lesions noted.  Neuro: Oriented x 3. No gross motor deficits.    Emergency Department Course     ECG:  Indication: Shortness of breath   Completed at 1954.  Read at 2000.   Atrial flutter with RVR with frequent PVC's.  Abnormal ECG.   Rate 152 bpm. FL interval 128. QRS duration 96. QT/QTc 262/416. P-R-T axes 97 173 54.    ECG:  Indication: Tachycardia  Completed at 2133.  Read at 2140.   Atrial flutter w/ RVR and PVCs. Low voltage QRS.  Abnormal ECG.    Rate 137 bpm. KY interval 194. QRS duration 96. QT/QTc 364/549. P-R-T axes 42 82 57.    ECG:  Indication: Repeat  Completed at 043.  Read at 48.   Atrial flutter with variable AV block. ST, elevation, consider inferior injury or acute infarct. Prolonged QT. Abnormal ECG.    Rate 97 bpm. KY interval *. QRS duration 92. QT/QTc 400/508. P-R-T axes -82 74 64.    Imaging:  Radiology findings were communicated with the patient who voiced understanding of the findings.     XR Chest Port 1 View:  1. Significantly decreased size of right perihilar mass since the   prior studies.   2. New left-sided [id number] since the prior studies. No   pneumothorax.   3. No new infiltrate is seen. Report per radiology     CT Chest PE w Contrast:  1.  Significant decreased size of mass in superior segment of right   lower lung lobe since the prior study dated 10/29/2019. There is   somewhat difficult morphology of this mass, however, with spiculated   margins and now a nodular excrescence extending anteriorly. This   should be closely followed.   2.  No new nodule is seen within the lungs.   3.  Right hilar lymphadenopathy appears more prominent than on the   prior CT but is difficult to compare due to the lack of IV contrast on   the prior CT.   4.  No evidence for pulmonary artery embolism.   5.  Common hepatic duct dilatation without choledocholithiasis. There   is subtle decreased attenuation in the head of pancreas adjacent to   the common bile duct. A subtle mass in this region is difficult to   exclude but is not definitely seen. Attention to this region on future   exams is recommended. Dynamic MRI of the abdomen/pancreas and MRCP may   be helpful. Report per radiology     Laboratory:  Laboratory findings were communicated with the patient who voiced understanding of the findings.    CBC: WBC 2.7 (L), HGB 12.2 (L),     BMP:  (L), Ca 8.3, o/w WNL (Creat 0.88)    TSH with free T4 Reflex: 2.96       Troponin: <0.015    D-Dimer: 2.6 (H)    Symptomatic COVID-19 PCR: Pending    Interventions:  2215 Cardizem 5 mg IV  2325 Cardizem 5 mg/hr IV    Emergency Department Course:  Past medical records, nursing notes, and vitals reviewed.    1925 I performed an exam of the patient as documented above.      EKG obtained in the ED, see results above.      IV was inserted and blood was drawn for laboratory testing, results above.    2030 Patient rechecked and updated. Patient reports shortness of breath is about the same as beginning of visit.      The patient was sent for a chest CT and XR while in the emergency department, results above.     2152 I rechecked the patient and discussed the results of his workup thus far.     2210 I spoke with Dr. Stauffer of the Hospitalist service regarding patient's presentation, findings, and plan of care.     The patient's nose was swabbed and this sample was sent for COVID testing, findings above.     2214 I spoke with Patient's spouse and updated them on the patients status and work up so far.     Findings and plan explained to the Patient who consents to admission. Discussed the patient with Dr. Stauffer, who will [place] the patient [place] [facility] [place] further monitoring, evaluation, and treatment.    I personally reviewed the laboratory and imaging results with the Patient and answered all related questions prior to admission.     Impression & Plan     Medical Decision Making:  Patient is a 70-year-old male who presents with worsening dyspnea on exertion beginning today.  Patient's medical history and records were reviewed.  Initial consideration for, but not limited to, symptoms secondary to known lung cancer, PE, arrhythmia, metabolic disturbance, electrolyte abnormality, infectious process, atypical ACS/MI, among others.  Labs, EKG, and imaging was obtained.  Initial EKG demonstrated sinus tachycardia versus atrial flutter with RVR; repeat EKG consistent with atrial flutter RVR and  frequent PVCs.  Patient denied chest pain and troponin was undetectable; atypical ACS unlikely.  Labs notable for normal thyroid studies, mild hyponatremia (sodium 131), mild leukopenia (white count 2.7), mild anemia (hemoglobin 12.2), and elevated D-dimer (2.6).  Initial chest x-ray shows decrease in size in right perihilar mass since prior study without other significant findings.  CTA of the chest shows no evidence of PE though there is common hepatic duct dilatation without choledocholithiasis and subtle decreased attenuation in the head of the pancreas adjacent to the common bile duct, therefore subtle mass since region is difficult to exclude per radiology; CT imaging follow-up recommendations above.  Patient does note sinus congestion and symptomatic Covid testing is pending.  Symptoms at this time seem consistent with atrial flutter with RVR.  Patient is not a candidate for ED cardioversion as time of onset is unknown.  He was initiated on diltiazem bolus and infusion with improvement in heart rate and atrial flutter with variable block demonstrated on repeat EKG.  Patient remained stable throughout ED course.  Admitted to the hospitalist service for further evaluation and care.  Atrial flutter is new onset for patient and possibly secondary to new study medication versus use of over-the-counter decongestant use versus other.    Diagnosis:    ICD-10-CM    1. Atrial flutter with rapid ventricular response (H)  I48.92 Symptomatic Influenza A/B & SARS-CoV2 (COVID-19) Virus PCR Multiplex       Disposition:  Admitted to [facility] [place] [place] bed under the care of .    Scribe Disclosure:  GIOVANNY, Tone Bacon, am serving as a scribe at 7:25 PM on 12/13/2020 to document services personally performed by Phillip Jacques DO based on my observations and the provider's statements to me.      Phillip Jacques DO  12/14/20 0059

## 2020-12-14 NOTE — ED NOTES
Spouse stopped at  for update.  Given number to ED and verified home phone to update her.  Patient's spouse sent home due to no visitor policy.

## 2020-12-14 NOTE — PLAN OF CARE
Summary: Admitted 12/14/2020 for evaluation of shortness of breath  Date/Time: 5:49 AM December 14, 2020  Cognitive Concerns/Orientation: A&Ox4  ABNL VS/02: Stable on RA  Cardiac: In the ED was Atrial flutter w/ RVR and PVC's, converted to NSR at 0313.  Resp: LS clear bilaterally  Mobility: Independent  Pain Management: Denies  Diet: Reg  Bowel/Bladder: Continent of both  ABNL Labs/BG: Troponin 0.015, WBC 2.5, Blood glucose  63, D-Dimer 2.6, Calcium 7.7  Drains/Devices: Cath Port-upper left chest  Telemetry Rhythm: NSR  Skin: Intact  CMS: Intact  Test Procedures: COVID results negative  Discharge day/Goals/Place: Pending pt progress.  Other Important Info: Gave interventions-orange/apple juice and gram crackers for low glycemic episode will recheck in 15 mintues-74. Gave more orange juice and gram crackers will recheck in 15 minutes. Pt is in clinical trail for chemo he is taking Docetaxel-there is a brochure in the front of his book.    History: Lung cancer with chronic shortness of breath, AAA, Diverticulosis of colon,  Hypertension, Malignant neoplasm of bladder-Cystoscopy, Biopsy bladder, combined, and Cystoscopy, transurethral resection.

## 2020-12-14 NOTE — CONSULTS
[facility] Consultation    Luis Stapleton MRN# [id number]   YOB: 1950 Age: 70 year old   Date of Admission: 12/13/2020  Requesting physician:  Dr. Mcdaniel  Reason for consult:  A fib with RVR, NSCLC           Assessment and Plan:     Atrial fib with RVR  --  in ER  --CT chest: NO PE  --D-dimer 2.6  -- On IV cardizem.   -- telemetry  --cardiology consult   - no contraindication from our perspective for anticoagulation for a-fib, and a DOAC would be OK. Will need to be attentive to platelet counts while on chemotherapy. .     Lung cancer, Stage IV  --Presented with a cough and a CT scan of the chest and PET CT scan on 11/15/2019 revealed a 6.3 cm right lower lobe pulmonary mass contacting the pleural surface without evidence of pleural invasion and with an indeterminate right hepatic lobe lesion as well  --Bronchoscopy on 11/18/2019 confirmed this squamous cell carcinoma in the right lower lobe, with negative efren biopsies.  --On 12/10/2019 the patient underwent laparoscopic wedge resection of the hepatic lesion that confirmed metastatic squamous cell carcinoma. Testing for PDL 1 is > 50% and BRAF is negative.  --[facility] testing revealed a microsatellite stable tumor, tumor mutational burden of 6, NFE2L2 (under testing), CDKN2a, and TP53 mutation  --Stage is T3N0M1 or stage URBANO with a resected isolated hepatic metastasis and no other evidence of distant disease.  --On 1/6/20 began chemoimmunotherapy with abraxane (weekly), carboplatin (every 3 weeks), and pembrolizumab (every 3 weeks)  --PET/CT 2/26/20 revealed an improved pulmonary mass with hepatic findings consistent with recent segment 7 segmentectomy.  --Began maintenance pembrolizumab every 3 weeks  --PET/CT 11/2/20 revealed new liver metastases, slight increase in metabolic activity of right lower   lobe pulmonary lesion, and slight decrease in hilar and precarinal adenopathy.  --Discussed second line chemotherapy with  docetaxel, gemcitabine, or docetaxel and ramucirumab, research studies (20142 with atezolizumab with cabozantinib versus docetaxel) , LungMaps trial, versus best supportive care  --Elected for research study 20142 and randomized to docetaxel IV every 3-week, cycle 1 administered 12/3/20  --CT done in ER on 12/13 and compared to 10/19/2019 shows spiculated irregular mass in the suprasellar right lower lung lobe measures up to 5.4 x 3.8 x 2.4 cm.  --Most recent CT as outpatient was 11/7/20 where this lung mass measured 5.1 x 4.5 cm.   Although 12/13/20 scan was not directly compared, it appears the lung mass is at least stable. In any case, it would be too early to determine response to chemotherapy.    Biliary ductal dilatation  - 12/13/20 CT shows common hepatic duct dilatation measuring 1.9 cm without choledocholithiasis. There is subtle decreased attenuation in the head of pancreas adjacent to the common bile duct. A subtle mass in this region is difficult to  exclude but is not definitely seen. Attention to this region on future exams is recommended. Dynamic MRI of the abdomen/pancreas and MRCP may be helpful.   11/6/20 CT shows mild dilatation of the main pancreatic duct most prominent in the pancreatic head measuring 0.8 cm in diameter not significairtly changed as compared to 11/25/2019 MRI. No definite solid pancreatic masses visualized.   11/2/20 PET scan: No abnormal FDG uptake in pancreas region.   11/15/19 MRI abdomen showed the common duct is mildly dilated, measuring up to 1.3 cm in diameter. No pancreatic mass identified.   - LFTs are normal.  Previously noted. Continue to observe.     Neutropenia  - chemotherapy induced  - WBC 2.5 with ANC yesterday of 100.  Was normal on Dec 3 prior to chemotherapy.   - monitor CBC with diff daily.  - in absence of fever, would not add growth factors.   - protective isolation.     Brain Metastasis  - Brain CT on 11/6/20 for clinical trial purposes demonstrated 8mm  right temporal lobe lesion.   - MRI brain 11/11/20: 1 cm enhancing mass within the right temporal lobe, most consistent with metastatic disease. Mild surrounding vasogenic edema.  - s/p single fraction stereotactic radiosurgery on 11/17/20   - Is not on steroids at this time.   - no new neuro symptoms    LE edema  --Bilateral lower extremity ultrasound 12/1/20 negative for DVT  --improving after resumption of ACEi  --monitor    Anemia  --due to malignancy and treatment  --monitor serially    Prior thrombocytopenia  --noted on  2/17/20,  occurring 3 weeks after last chemotherapy  --blood morphology unremarkable, HIT test negative  --suspect autoimmune etiology  --treated with decadron 40 mg daily for 4 days  --thrombocytopenia resolved       Abby Bansal APRN, CNP  [facility]  [phone]    Anthony Arreguin MD             Chief Complaint:   Shortness of Breath           History of Present Illness:   This patient is a 70 year old male who presented with a cough and a CT scan of the chest and PET CT scan on 11/15/2019 revealed a 6.3 cm right lower lobe pulmonary mass contacting the pleural surface without evidence of pleural invasion and with an indeterminate right hepatic lobe lesion as well.    A bronchoscopy on 11/18/2019 confirmed this squamous cell carcinoma in the right lower lobe, with negative efren biopsies.    A MRI of the abdomen on 11/25/2019 revealed a suspicious 1.3 cm right hepatic lobe nodule, without other evidence of malignancy    A MRI of the brain on 11/29/2019 was negative for malignancy    As the hepatic lesion was not visualized on an ultrasound of the abdomen, on 12/10/2019 the patient underwent laparoscopic wedge resection of the hepatic lesion that confirmed metastatic squamous cell carcinoma. Stage is T3N0M1 or stage URBANO with a resected isolated hepatic metastasis and no other evidence of distant disease..    Testing for PDL 1 is > 50% and BRAF is negative.    [facility]  testing revealed a microsatellite stable tumor, tumor mutational burden of 6, NFE2L2 (under testing), CDKN2a, and TP53 mutation    On 1/6/20 began chemoimmunotherapy with abraxane (weekly), carboplatin (every 3 weeks), and pembrolizumab (every 3 weeks). Fosaprepitant has been discontinued after cycle 1 due to allergic reaction    Chemotherapy doses have been delayed and adjusted due to cytopenias.    2/17/2028 labs revealed new thrombocytopenia, 3 weeks after his last chemotherapy, and possibly of autoimmune etiology.    Decadron 40 mg daily for 4 days begun 2/17/20, with conversion to prednisone 2/21/20.    PET/CT 2/26/20 revealed an improved pulmonary mass with hepatic findings consistent with recent segment 7 segmentectomy.    Pembrolizumab held with cycle 3 and 4.    PET/CT 4/30/20 revealed slight increase activity along the wall of the cystic appearing mass in the right lower lobe with mild right hilar adenopathy, but no evidence of other progressive disease.    PET CT scan 6/29/2020 revealed slight decrease in size and metabolic activity of the right lower lobe cavitary mass, with indeterminate increase in size of a right hilar node without significant change in degree of hypermetabolic in that node    PET/CT 8/20/20 revealed slight interval decrease in the size however with slight increase in the FDG uptake of the right lower lobe lung mass as compared to 6/29/2020 PET/CT, no change in right hilar node with decreased FDG uptake, and a slight increase in a 1.1 cm precarinal node    PET/CT 11/2/20 revealed probable new liver metastases, slight increase in metabolic activity of right lower lobe pulmonary lesion, and slight decrease in hilar and precarinal adenopathy.    Brain CT on 11/6/20 for clinical trial purposes demonstrated 8mm right temporal lobe lesion.     MRI brain 11/11/20: 1 cm enhancing mass within the right temporal lobe, most consistent with metastatic disease. Mild surrounding vasogenic  "edema.    s/p single fraction stereotactic radiosurgery on 20     Bilateral lower extremity ultrasound 20 negative for DVT    Elected for research study  and randomized to docetaxel IV every 3-week, cycle 1 administered 12/3/20           Physical Exam:   Vitals were reviewed  Blood pressure 103/43, pulse 81, temperature 97.8  F (36.6  C), temperature source Oral, resp. rate 16, height 1.829 m (6' 0.01\"), weight 89.4 kg (197 lb 1.6 oz), SpO2 98 %.  Temperatures:  Current - Temp: 97.8  F (36.6  C); Max - Temp  Av  F (36.7  C)  Min: 97.8  F (36.6  C)  Max: 98.4  F (36.9  C)  Respiration range: Resp  Av.3  Min: 11  Max: 25  Pulse range: Pulse  Av.3  Min: 81  Max: 154  Blood pressure range: Systolic (24hrs), Av , Min:93 , Max:121   ; Diastolic (24hrs), Av, Min:43, Max:87    Pulse oximetry range: SpO2  Av.8 %  Min: 91 %  Max: 100 %    Intake/Output Summary (Last 24 hours) at 2020 1042  Last data filed at 2020 0920  Gross per 24 hour   Intake 480 ml   Output --   Net 480 ml       GENERAL: No acute distress.  SKIN: No rashes or jaundice.  HEENT: Normocephalic, atraumatic. Eyes anicteric. Oropharynx is clear.  LYMPH: No palpable lymphadenopathy in the cervical, supraclavicular, axillary, or inguinal regions.  HEART: Regular rate and rhythm with no murmurs.  LUNGS: Clear bilaterally.  ABDOMEN: Soft, nontender, nondistended with no palpable hepatosplenomegaly.  EXTREMITIES: No clubbing, cyanosis, or edema.  MUSCULOSKELETAL: No pain to percussion over entire spine.  MENTAL: Alert and oriented to person, place, and time.  NEURO: Cranial nerves II through XII grossly intact with no focal motor or sensory deficits.            Past Medical History:   I have reviewed this patient's past medical history  Past Medical History:   Diagnosis Date     Aneurysm (H)     ABDOMINAL      Calculus of kidney      Diverticulosis of colon (without mention of hemorrhage)      Hypertension  "     Lung cancer (H)      Malignant neoplasm of bladder, part unspecified      Other and unspecified hyperlipidemia              Past Surgical History:   I have reviewed this patient's past surgical history  Past Surgical History:   Procedure Laterality Date     ABDOMEN SURGERY      AAA     ABDOMEN SURGERY  12/10/2019    liver biopsies and removal of nodules     BRONCHOSCOPY       CYSTOSCOPY, BIOPSY BLADDER, COMBINED  2012    Procedure: COMBINED CYSTOSCOPY, BIOPSY BLADDER;;  Surgeon: Jonas Young MD;  Location: [facility] OR     CYSTOSCOPY, RETROGRADES, COMBINED  2012    Procedure: COMBINED CYSTOSCOPY, RETROGRADES;  CYSTOSCOPY, BILATERAL RETROGRADES, BLADDER BIOPSY, FULGURATION;  Surgeon: Jonas Young MD;  Location: [facility] OR     CYSTOSCOPY, TRANSURETHRAL RESECTION (TUR) TUMOR BLADDER, COMBINED       HERNIA REPAIR       INSERT PORT VASCULAR ACCESS N/A 2019    Procedure: INSERTION, VASCULAR ACCESS PORT FLAT PLATE X RAY;  Surgeon: Pradip Sosa MD;  Location:  OR     REPAIR ANEURYSM ABDOMINAL AORTA                     Social History:   I have reviewed this patient's social history  Social History     Tobacco Use     Smoking status: Former Smoker     Packs/day: 1.00     Years: 45.00     Pack years: 45.00     Quit date: 3/4/2008     Years since quittin.7     Smokeless tobacco: Never Used   Substance Use Topics     Alcohol use: Yes     Comment: .5 OZ WEEK             Family History:   I have reviewed this patient's family history  No family history on file.          Allergies:   No Known Allergies          Medications:   I have reviewed this patient's current medications  Medications Prior to Admission   Medication Sig Dispense Refill Last Dose     Cholecalciferol (VITAMIN D3 PO) Take 1,000 Units by mouth.          [START ON 2020] lisinopril (ZESTRIL) 10 MG tablet Take 1 tablet (10 mg) by mouth daily 90 tablet 1      multivitamin (CENTRUM SILVER) tablet Take 1 tablet by mouth  daily        Nutritional Supplements (BLADDER 2.2 PO) Take 1 tablet by mouth 2 times daily.          simvastatin (ZOCOR) 40 MG tablet TAKE ONE TABLET BY MOUTH ONCE DAILY AT BEDTIME 90 tablet 3      Current Facility-Administered Medications Ordered in Epic   Medication Dose Route Frequency Last Rate Last Admin     acetaminophen (TYLENOL) tablet 650 mg  650 mg Oral Q4H PRN         aspirin EC tablet 81 mg  81 mg Oral Daily   81 mg at 12/14/20 0522     bisacodyl (DULCOLAX) Suppository 10 mg  10 mg Rectal Daily PRN         enoxaparin ANTICOAGULANT (LOVENOX) injection 40 mg  40 mg Subcutaneous Q24H   40 mg at 12/14/20 0522     heparin 100 UNIT/ML injection 5 mL  5 mL Intracatheter Q28 Days         heparin lock flush 10 UNIT/ML injection 5-10 mL  5-10 mL Intracatheter Q1H PRN   5 mL at 12/14/20 0537     heparin lock flush 10 UNIT/ML injection 5-10 mL  5-10 mL Intracatheter Q24H         heparin lock flush 10 UNIT/ML injection 5-10 mL  5-10 mL Intracatheter Q1H PRN   5 mL at 12/14/20 0957     lidocaine (LMX4) cream   Topical Q1H PRN         lidocaine 1 % 0.1-1 mL  0.1-1 mL Other Q1H PRN         melatonin tablet 1 mg  1 mg Oral At Bedtime PRN         metoprolol tartrate (LOPRESSOR) half-tab 12.5 mg  12.5 mg Oral BID   12.5 mg at 12/14/20 0804     naloxone (NARCAN) injection 0.2 mg  0.2 mg Intravenous Q2 Min PRN        Or     naloxone (NARCAN) injection 0.4 mg  0.4 mg Intravenous Q2 Min PRN        Or     naloxone (NARCAN) injection 0.2 mg  0.2 mg Intramuscular Q2 Min PRN        Or     naloxone (NARCAN) injection 0.4 mg  0.4 mg Intramuscular Q2 Min PRN         No anticoagulants IF patient has had acute trauma/surgery or recent intracranial, GI or urinary tract bleeding.    Other DOES NOT GO TO MAR         ondansetron (ZOFRAN-ODT) ODT tab 4 mg  4 mg Oral Q6H PRN        Or     ondansetron (ZOFRAN) injection 4 mg  4 mg Intravenous Q6H PRN         oxyCODONE (ROXICODONE) tablet 5-10 mg  5-10 mg Oral Q3H PRN         sodium chloride  (PF) 0.9% PF flush 10-20 mL  10-20 mL Intracatheter q1 min prn         sodium chloride (PF) 0.9% PF flush 10-20 mL  10-20 mL Intracatheter Q1H PRN         sodium chloride (PF) 0.9% PF flush 3 mL  3 mL Intracatheter Q8H   3 mL at 12/14/20 0537     sodium chloride (PF) 0.9% PF flush 3 mL  3 mL Intracatheter q1 min prn         No current [facility]-ordered outpatient medications on file.             Review of Systems:   The 10 point Review of Systems is negative other than noted in the HPI.            Data:   Data   Results for orders placed or performed during the hospital encounter of 12/13/20 (from the past 24 hour(s))   CBC with platelets differential   Result Value Ref Range    WBC 2.7 (L) 4.0 - 11.0 10e9/L    RBC Count 4.12 (L) 4.4 - 5.9 10e12/L    Hemoglobin 12.2 (L) 13.3 - 17.7 g/dL    Hematocrit 37.6 (L) 40.0 - 53.0 %    MCV 91 78 - 100 fl    MCH 29.6 26.5 - 33.0 pg    MCHC 32.4 31.5 - 36.5 g/dL    RDW 11.9 10.0 - 15.0 %    Platelet Count 284 150 - 450 10e9/L    Diff Method Manual Differential     % Neutrophils 3.0 %    % Lymphocytes 70.0 %    % Monocytes 25.0 %    % Eosinophils 1.0 %    % Basophils 1.0 %    Absolute Neutrophil 0.1 (LL) 1.6 - 8.3 10e9/L    Absolute Lymphocytes 1.9 0.8 - 5.3 10e9/L    Absolute Monocytes 0.7 0.0 - 1.3 10e9/L    Absolute Eosinophils 0.0 0.0 - 0.7 10e9/L    Absolute Basophils 0.0 0.0 - 0.2 10e9/L    RBC Morphology Consistent with reported results     Platelet Estimate       Automated count confirmed.  Platelet morphology is normal.   Basic metabolic panel   Result Value Ref Range    Sodium 131 (L) 133 - 144 mmol/L    Potassium 3.9 3.4 - 5.3 mmol/L    Chloride 100 94 - 109 mmol/L    Carbon Dioxide 22 20 - 32 mmol/L    Anion Gap 9 3 - 14 mmol/L    Glucose 87 70 - 99 mg/dL    Urea Nitrogen 12 7 - 30 mg/dL    Creatinine 0.88 0.66 - 1.25 mg/dL    GFR Estimate 86 >60 mL/min/[1.73_m2]    GFR Estimate If Black >90 >60 mL/min/[1.73_m2]    Calcium 8.3 (L) 8.5 - 10.1 mg/dL   TSH with free T4  reflex   Result Value Ref Range    TSH 2.96 0.40 - 4.00 mU/L   D dimer quantitative   Result Value Ref Range    D Dimer 2.6 (H) 0.0 - 0.50 ug/ml FEU   Troponin I   Result Value Ref Range    Troponin I ES <0.015 0.000 - 0.045 ug/L   EKG 12 lead   Result Value Ref Range    Interpretation ECG Click View Image link to view waveform and result    XR Chest Port 1 View    Narrative    CHEST PORTABLE ONE VIEW   12/13/2020 8:14 PM     HISTORY: SOB.    COMPARISON: CT chest dated 10/19/2019, chest x-rays dated 10/29/2019.    FINDINGS:  The right perihilar midlung mass has significantly  decreased in size since prior chest x-rays and CT. This now measures  approximately 4.6 x 2.5 cm in the transverse and craniocaudal  dimensions respectively as compared to 6.8 x 5.6 cm in the same  dimensions on the prior study dated 10/29/2019.    There is a left chest wall [id number] with subclavian central  venous catheter projected in the appropriate positions. Left lung is  clear. No new right lung abnormality is seen. No pneumothorax,  significant pleural fluid collection, or acute osseous fracture.      Impression    IMPRESSION:  1. Significantly decreased size of right perihilar mass since the  prior studies.  2. New left-sided [id number] since the prior studies. No  pneumothorax.  3. No new infiltrate is seen.    MIREYA DAS MD   CT Chest Pulmonary Embolism w Contrast    Addendum: 12/13/2020    JENNIFER KOTHARI    Accession # [id number]    The original report on this patient was dictated by me.  The common  bile duct is abnormally distended in the upper abdomen. This was  incorrectly described as the common hepatic duct in the first sentence  of the upper abdominal portion of the findings and in #5 of the  impression of the original report.      (Date of Addendum: 12/13/2020 10:02 PM)      MIREYA DAS MD      Narrative    CT CHEST PULMONARY EMBOLISM WITH CONTRAST 12/13/2020 9:14 PM    CLINICAL HISTORY: SOB, lung cancer, rule  out PE.    TECHNIQUE: CT angiogram chest during arterial phase injection IV  contrast. 2D and 3D MIP reconstructions were performed by the CT  technologist. Dose reduction techniques were used.     CONTRAST: 73mL [id number]-370    COMPARISON: Chest x-rays dated 12/13/2020 and 10/29/2019 and CT chest  dated 10/29/2019.    FINDINGS:  ANGIOGRAM CHEST: Pulmonary arteries are normal caliber and negative  for pulmonary emboli. Thoracic aorta is negative for dissection or  aneurysm. There are thoracic aortic calcifications. No CT evidence of  right heart strain.    LUNGS AND PLEURA: Spiculated irregular mass in the suprasellar right  lower lung lobe measures up to 5.4 x 3.8 x 2.4 cm in the AP,  transverse and craniocaudal dimensions respectively as compared to 6.4  x 5.9 x 5.6 cm on the prior study dated 10/29/2019. Morphology has  changed and there are more spiculations along the edge and there is  also now a nodular appearance along the anterior aspect of the mass.  This represents significant improvement but incomplete resolution of  malignancy in the superior segment of the right lower lung lobe.    No other significant nodularity, mass, infiltrate, effusion, or  pneumothorax is identified.    MEDIASTINUM/AXILLAE: There is right hilar lymphadenopathy measuring up  to 1.5 cm in short axis. This appears more prominent than on the prior  study but is difficult to compare due to lack of IV contrast on the  prior study. No mediastinal, hilar, or left axillary adenopathy is  seen. The heart is grossly normal in size. There are coronary artery  and aortic calcifications.    UPPER ABDOMEN: There is marked common hepatic duct dilatation  measuring up to 1.9 cm in diameter. This is more prominent than on the  prior study. No definite choledocholithiasis is seen. There is mild  pancreatic ductal dilatation. There is subtle decreased attenuation in  the head of pancreas near the common bile duct. Mass in this region is  difficult to  entirely exclude although is not definitely seen. No  significant intrahepatic biliary ductal dilatation is identified. No  cholelithiasis. Gallbladder is unremarkable. Visualized portions of  the upper abdominal contents are otherwise grossly unremarkable.    MUSCULOSKELETAL: No aggressive osseous lesions or acute osseous  fractures are seen.      Impression    IMPRESSION:  1.  Significant decreased size of mass in superior segment of right  lower lung lobe since the prior study dated 10/29/2019. There is  somewhat difficult morphology of this mass, however, with spiculated  margins and now a nodular excrescence extending anteriorly. This  should be closely followed.  2.  No new nodule is seen within the lungs.  3.  Right hilar lymphadenopathy appears more prominent than on the  prior CT but is difficult to compare due to the lack of IV contrast on  the prior CT.  4.  No evidence for pulmonary artery embolism.  5.  Common hepatic duct dilatation without choledocholithiasis. There  is subtle decreased attenuation in the head of pancreas adjacent to  the common bile duct. A subtle mass in this region is difficult to  exclude but is not definitely seen. Attention to this region on future  exams is recommended. Dynamic MRI of the abdomen/pancreas and MRCP may  be helpful.    MIREYA DAS MD   EKG 12 lead   Result Value Ref Range    Interpretation ECG Click View Image link to view waveform and result    Symptomatic Influenza A/B & SARS-CoV2 (COVID-19) Virus PCR Multiplex    Specimen: Nasopharyngeal   Result Value Ref Range    Flu A/B & SARS-COV-2 PCR Source Nasopharyngeal     SARS-CoV-2 PCR Result NEGATIVE     Influenza A PCR Negative NEG^Negative    Influenza B PCR Negative NEG^Negative    Respiratory Syncytial Virus PCR Negative NEG^Negative    Flu A/B & SARS-CoV-2 PCR Comment (Note)    EKG 12 lead   Result Value Ref Range    Interpretation ECG Click View Image link to view waveform and result    EKG 12 lead   Result  Value Ref Range    Interpretation ECG Click View Image link to view waveform and result    CBC with platelets   Result Value Ref Range    WBC 2.5 (L) 4.0 - 11.0 10e9/L    RBC Count 3.79 (L) 4.4 - 5.9 10e12/L    Hemoglobin 11.2 (L) 13.3 - 17.7 g/dL    Hematocrit 35.0 (L) 40.0 - 53.0 %    MCV 92 78 - 100 fl    MCH 29.6 26.5 - 33.0 pg    MCHC 32.0 31.5 - 36.5 g/dL    RDW 12.0 10.0 - 15.0 %    Platelet Count 248 150 - 450 10e9/L   Comprehensive metabolic panel   Result Value Ref Range    Sodium 133 133 - 144 mmol/L    Potassium 3.9 3.4 - 5.3 mmol/L    Chloride 102 94 - 109 mmol/L    Carbon Dioxide 20 20 - 32 mmol/L    Anion Gap 11 3 - 14 mmol/L    Glucose 63 (L) 70 - 99 mg/dL    Urea Nitrogen 10 7 - 30 mg/dL    Creatinine 0.82 0.66 - 1.25 mg/dL    GFR Estimate 89 >60 mL/min/[1.73_m2]    GFR Estimate If Black >90 >60 mL/min/[1.73_m2]    Calcium 7.7 (L) 8.5 - 10.1 mg/dL    Bilirubin Total 0.4 0.2 - 1.3 mg/dL    Albumin 2.4 (L) 3.4 - 5.0 g/dL    Protein Total 6.0 (L) 6.8 - 8.8 g/dL    Alkaline Phosphatase 28 (L) 40 - 150 U/L    ALT 18 0 - 70 U/L    AST 25 0 - 45 U/L   Lipid panel reflex to direct LDL   Result Value Ref Range    Cholesterol 99 <200 mg/dL    Triglycerides 115 <150 mg/dL    HDL Cholesterol 30 (L) >39 mg/dL    LDL Cholesterol Calculated 46 <100 mg/dL    Non HDL Cholesterol 69 <130 mg/dL   Troponin I   Result Value Ref Range    Troponin I ES <0.015 0.000 - 0.045 ug/L   Magnesium   Result Value Ref Range    Magnesium 1.8 1.6 - 2.3 mg/dL   Glucose by meter   Result Value Ref Range    Glucose 74 70 - 99 mg/dL   Glucose by meter   Result Value Ref Range    Glucose 104 (H) 70 - 99 mg/dL

## 2020-12-14 NOTE — CONSULTS
[facility]    Cardiology Consultation     Date of Admission:  12/13/2020    Assessment & Plan   Luis Stapleton is a 70 year old male who was admitted on 12/13/2020.    1.  Atrial flutter with symptoms of increasing shortness of breath.  Patient was not aware of rapid heart rate.  Diltiazem is converted to sinus rhythm.  His chads vascular score is 3 given his age, hypertension as well as vascular disease in terms of abdominal aortic aneurysm.  Therefore, he would benefit from long-term anticoagulation.  New anticoagulants would be preferred.  Should be considered if okay with oncology.  Agree with echocardiogram.  He has been started on metoprolol that can be titrated upwards as able.  Continue other home meds like lisinopril and simvastatin.  His TSH is normal.    2.  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  Has mild anemia likely due to chemo.    3.  Hypertension, blood pressure well controlled.    4.  Mild hyponatremia, stable, sodium improved to 133 now    Geoffrey Garzon MD    Primary Care Physician   Cam Avila    Reason for Consult   Reason for consult: I was asked by hospitalist to evaluate this patient for atrial flutter.    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  He was diagnosed with this in November 2019.  At that time he also had hepatic lobe metastasis that was excised.  He was started on therapy with pembrolizumab, carboplatin and Abraxane initially followed by maintenance pembrolizumab.  In November of this year, he was diagnosed with additional liver metastases as well as a brain metastasis.  For the brain metastasis, he underwent stereotactic brain surgery.  Recently,  options were given to him to start doxy Taxol versus other immunotherapy.  He decided to start docetaxel.    Recently, he was seen in the [place] oncology office for lower extremity edema and Doppler was negative for DVT.  He now presents with increasing shortness of breath over his baseline.  He felt winded on walking across the room.  He had mild rhinorrhea but no Covid exposure.  His recent complaint with leg swelling is improved.  His heart was racing on admission and was not tachycardia but he was not aware.  Initial EKGs revealed a tachycardia with PVCs.  The rhythm is difficult to assess but could be at least 1 atrial flutter.  Subsequent EKGs revealed clear atrial flutter.  He was started on IV diltiazem and is now converted to sinus rhythm.  Prolonged QTC was noted.    He had a CT PE protocol that revealed decrease in the mass in the superior segment of the right lower lung lobe.  No PE.  There was common hepatic duct dilatation without coloductal lithiasis.  There is decreased attenuation in the head of the pancreas.  A subtle mass in this region was not excluded.    Also has past medical history of abdominal aortic aneurysm that was repaired surgically.  He has history of hypertension hyperlipidemia and previous prostate cancer    Patient Active Problem List   Diagnosis     CARDIOVASCULAR SCREENING; LDL GOAL LESS THAN 100     Benign essential hypertension     Malignant neoplasm of urinary bladder, unspecified site (H)     Abdominal aortic aneurysm (AAA) without rupture (H)     Right lower lobe lung mass     Malignant neoplasm of lower lobe of right lung (H)     Atrial flutter with rapid ventricular response (H)       Past Medical History   I have reviewed this patient's medical history and updated it with pertinent information if needed.   Past Medical History:   Diagnosis Date     Aneurysm (H)     ABDOMINAL      Calculus of kidney      Diverticulosis of colon (without mention of hemorrhage)       Hypertension      Lung cancer (H)      Malignant neoplasm of bladder, part unspecified      Other and unspecified hyperlipidemia        Past Surgical History   I have reviewed this patient's surgical history and updated it with pertinent information if needed.  Past Surgical History:   Procedure Laterality Date     ABDOMEN SURGERY      AAA     ABDOMEN SURGERY  12/10/2019    liver biopsies and removal of nodules     BRONCHOSCOPY       CYSTOSCOPY, BIOPSY BLADDER, COMBINED  6/18/2012    Procedure: COMBINED CYSTOSCOPY, BIOPSY BLADDER;;  Surgeon: Jonas Young MD;  Location: [facility] OR     CYSTOSCOPY, RETROGRADES, COMBINED  6/18/2012    Procedure: COMBINED CYSTOSCOPY, RETROGRADES;  CYSTOSCOPY, BILATERAL RETROGRADES, BLADDER BIOPSY, FULGURATION;  Surgeon: Jonas Young MD;  Location: [facility] OR     CYSTOSCOPY, TRANSURETHRAL RESECTION (TUR) TUMOR BLADDER, COMBINED       HERNIA REPAIR       INSERT PORT VASCULAR ACCESS N/A 12/27/2019    Procedure: INSERTION, VASCULAR ACCESS PORT FLAT PLATE X RAY;  Surgeon: Pradip Sosa MD;  Location: [facility] OR     REPAIR ANEURYSM ABDOMINAL AORTA      2009       Prior to Admission Medications   Prior to Admission Medications   Prescriptions Last Dose Informant Patient Reported? Taking?   Cholecalciferol (VITAMIN D3 PO)   Yes No   Sig: Take 1,000 Units by mouth.     Nutritional Supplements (BLADDER 2.2 PO)   Yes No   Sig: Take 1 tablet by mouth 2 times daily.     lisinopril (ZESTRIL) 10 MG tablet   No No   Sig: Take 1 tablet (10 mg) by mouth daily   multivitamin (CENTRUM SILVER) tablet   Yes No   Sig: Take 1 tablet by mouth daily   simvastatin (ZOCOR) 40 MG tablet   No No   Sig: TAKE ONE TABLET BY MOUTH ONCE DAILY AT BEDTIME      Facility-Administered Medications: None     Current Facility-Administered Medications   Medication Dose Route Frequency     aspirin  81 mg Oral Daily     enoxaparin ANTICOAGULANT  40 mg Subcutaneous Q24H     heparin  5 mL Intracatheter Q28 Days     heparin  "lock flush  5-10 mL Intracatheter Q24H     metoprolol tartrate  12.5 mg Oral BID     sodium chloride (PF)  3 mL Intracatheter Q8H     Current Facility-Administered Medications   Medication Last Rate     Reason anticoagulant not prescribed for atrial fibrillation       Allergies   No Known Allergies    Social History    reports that he quit smoking about 12 years ago. He has a 45.00 pack-year smoking history. He has never used smokeless tobacco. He reports current alcohol use. He reports that he does not use drugs.    Family History   No family history on file.    Review of Systems   The comprehensive 10 point Review of Systems is negative other than noted in the HPI or here.     Physical Exam   Vital Signs with Ranges  Temp:  [97.8  F (36.6  C)-98.4  F (36.9  C)] 97.8  F (36.6  C)  Pulse:  [] 88  Resp:  [11-25] 16  BP: ()/(53-87) 116/57  SpO2:  [91 %-100 %] 98 %  Wt Readings from Last 4 Encounters:   12/14/20 89.4 kg (197 lb 1.6 oz)   12/27/19 86.8 kg (191 lb 6 oz)   12/06/19 90.3 kg (199 lb)   10/30/19 90.3 kg (199 lb)     No intake/output data recorded.      Vitals: /57 (BP Location: Left arm)   Pulse 88   Temp 97.8  F (36.6  C) (Oral)   Resp 16   Ht 1.829 m (6' 0.01\")   Wt 89.4 kg (197 lb 1.6 oz)   SpO2 98%   BMI 26.73 kg/m      Constitutional:   alert   Eyes:   extra-ocular muscles intact   ENT:   atramatic   Neck:   no jugular venous distension   Hematologic / Lymphatic:   no cervical lymphadenopathy, pallor   Back:   no curvature   Lungs:   clear to auscultation   Cardiovascular:   normal S1 and S2 and no murmur noted   Abdomen:   non-distended and non-tender     Neurologic:   Motor Exam:  moves all extremities well and symmetrically   Neuropsychiatric:   Orientation: oriented to self, place, time and situation   Skin:   no rashes       Recent Labs   Lab 12/14/20  0530 12/13/20 1954   TROPI <0.015 <0.015       Recent Labs   Lab 12/14/20  0530 12/13/20 1954   WBC 2.5* 2.7*   HGB 11.2* " 12.2*   MCV 92 91    284    131*   POTASSIUM 3.9 3.9   CHLORIDE 102 100   CO2 20 22   BUN 10 12   CR 0.82 0.88   GFRESTIMATED 89 86   GFRESTBLACK >90 >90   ANIONGAP 11 9   HORACE 7.7* 8.3*   GLC 63* 87   ALBUMIN 2.4*  --    PROTTOTAL 6.0*  --    BILITOTAL 0.4  --    ALKPHOS 28*  --    ALT 18  --    AST 25  --    TROPI <0.015 <0.015     Recent Labs   Lab Test 12/14/20 0530 07/03/20  0753   CHOL 99 148   HDL 30* 46   LDL 46 82   TRIG 115 86     Recent Labs   Lab 12/14/20 0530 12/13/20 1954   WBC 2.5* 2.7*   HGB 11.2* 12.2*   HCT 35.0* 37.6*   MCV 92 91    284     No results for input(s): PH, PHV, PO2, PO2V, SAT, PCO2, PCO2V, HCO3, HCO3V in the last 168 hours.  No results for input(s): NTBNPI, NTBNP in the last 168 hours.  Recent Labs   Lab 12/13/20 1954   DD 2.6*     No results for input(s): SED, CRP in the last 168 hours.  Recent Labs   Lab 12/14/20 0530 12/13/20 1954    284     Recent Labs   Lab 12/13/20 1954   TSH 2.96     No results for input(s): COLOR, APPEARANCE, URINEGLC, URINEBILI, URINEKETONE, SG, UBLD, URINEPH, PROTEIN, UROBILINOGEN, NITRITE, LEUKEST, RBCU, WBCU in the last 168 hours.    Imaging:  Recent Results (from the past 48 hour(s))   XR Chest Port 1 View    Narrative    CHEST PORTABLE ONE VIEW   12/13/2020 8:14 PM     HISTORY: SOB.    COMPARISON: CT chest dated 10/19/2019, chest x-rays dated 10/29/2019.    FINDINGS:  The right perihilar midlung mass has significantly  decreased in size since prior chest x-rays and CT. This now measures  approximately 4.6 x 2.5 cm in the transverse and craniocaudal  dimensions respectively as compared to 6.8 x 5.6 cm in the same  dimensions on the prior study dated 10/29/2019.    There is a left chest wall Whnyqg-t-Xosa with subclavian central  venous catheter projected in the appropriate positions. Left lung is  clear. No new right lung abnormality is seen. No pneumothorax,  significant pleural fluid collection, or acute osseous  fracture.      Impression    IMPRESSION:  1. Significantly decreased size of right perihilar mass since the  prior studies.  2. New left-sided Vlgaec-v-Jyzg since the prior studies. No  pneumothorax.  3. No new infiltrate is seen.    MIREYA DAS MD   CT Chest Pulmonary Embolism w Contrast    Addendum: 12/13/2020    JENNIFER KOTHARI    Accession # [id number]    The original report on this patient was dictated by me.  The common  bile duct is abnormally distended in the upper abdomen. This was  incorrectly described as the common hepatic duct in the first sentence  of the upper abdominal portion of the findings and in #5 of the  impression of the original report.      (Date of Addendum: 12/13/2020 10:02 PM)      MIREYA DAS MD      Narrative    CT CHEST PULMONARY EMBOLISM WITH CONTRAST 12/13/2020 9:14 PM    CLINICAL HISTORY: SOB, lung cancer, rule out PE.    TECHNIQUE: CT angiogram chest during arterial phase injection IV  contrast. 2D and 3D MIP reconstructions were performed by the CT  technologist. Dose reduction techniques were used.     CONTRAST: 73mL Isovue-370    COMPARISON: Chest x-rays dated 12/13/2020 and 10/29/2019 and CT chest  dated 10/29/2019.    FINDINGS:  ANGIOGRAM CHEST: Pulmonary arteries are normal caliber and negative  for pulmonary emboli. Thoracic aorta is negative for dissection or  aneurysm. There are thoracic aortic calcifications. No CT evidence of  right heart strain.    LUNGS AND PLEURA: Spiculated irregular mass in the suprasellar right  lower lung lobe measures up to 5.4 x 3.8 x 2.4 cm in the AP,  transverse and craniocaudal dimensions respectively as compared to 6.4  x 5.9 x 5.6 cm on the prior study dated 10/29/2019. Morphology has  changed and there are more spiculations along the edge and there is  also now a nodular appearance along the anterior aspect of the mass.  This represents significant improvement but incomplete resolution of  malignancy in the superior segment of the  right lower lung lobe.    No other significant nodularity, mass, infiltrate, effusion, or  pneumothorax is identified.    MEDIASTINUM/AXILLAE: There is right hilar lymphadenopathy measuring up  to 1.5 cm in short axis. This appears more prominent than on the prior  study but is difficult to compare due to lack of IV contrast on the  prior study. No mediastinal, hilar, or left axillary adenopathy is  seen. The heart is grossly normal in size. There are coronary artery  and aortic calcifications.    UPPER ABDOMEN: There is marked common hepatic duct dilatation  measuring up to 1.9 cm in diameter. This is more prominent than on the  prior study. No definite choledocholithiasis is seen. There is mild  pancreatic ductal dilatation. There is subtle decreased attenuation in  the head of pancreas near the common bile duct. Mass in this region is  difficult to entirely exclude although is not definitely seen. No  significant intrahepatic biliary ductal dilatation is identified. No  cholelithiasis. Gallbladder is unremarkable. Visualized portions of  the upper abdominal contents are otherwise grossly unremarkable.    MUSCULOSKELETAL: No aggressive osseous lesions or acute osseous  fractures are seen.      Impression    IMPRESSION:  1.  Significant decreased size of mass in superior segment of right  lower lung lobe since the prior study dated 10/29/2019. There is  somewhat difficult morphology of this mass, however, with spiculated  margins and now a nodular excrescence extending anteriorly. This  should be closely followed.  2.  No new nodule is seen within the lungs.  3.  Right hilar lymphadenopathy appears more prominent than on the  prior CT but is difficult to compare due to the lack of IV contrast on  the prior CT.  4.  No evidence for pulmonary artery embolism.  5.  Common hepatic duct dilatation without choledocholithiasis. There  is subtle decreased attenuation in the head of pancreas adjacent to  the common bile  duct. A subtle mass in this region is difficult to  exclude but is not definitely seen. Attention to this region on future  exams is recommended. Dynamic MRI of the abdomen/pancreas and MRCP may  be helpful.    MIREYA DAS MD       Echo:  No results found for this or any previous visit (from the past 4320 hour(s)).

## 2020-12-14 NOTE — PROGRESS NOTES
RECEIVING UNIT ED HANDOFF REVIEW    ED Nurse Handoff Report was reviewed by: Amelie Aviles RN on December 14, 2020 at 3:38 AM

## 2020-12-14 NOTE — PROGRESS NOTES
MD Notification    Notified Person: MD    Notified Person Name:Dr. Stauffer    Notification Date/Time:12/14/2020 @0500    Notification Interaction:Web Page    Purpose of Notification: Cardizem gtt stopped in the ED at 0313, pt went into NSR. Has Metoprolol starting in the a.m. Would you like to start sooner or do you have any further recommendations.    Orders Received:No new orders, recieved    Comments:

## 2020-12-14 NOTE — ED TRIAGE NOTES
Lung cancer.  States he has not been around anyone sick.  Feels like he can't fill his lungs all the way.  Sob.

## 2020-12-14 NOTE — DISCHARGE INSTRUCTIONS
Please purchase a blood pressure cuff to monitor you blood pressure and heart rate.  Schedule an appointment with your primary care doctor in about a week and bring you blood pressure chart with you.

## 2020-12-14 NOTE — PHARMACY-ADMISSION MEDICATION HISTORY
Pharmacy Medication History  Admission medication history interview status for the 12/13/2020  admission is complete. See EPIC admission navigator for prior to admission medications       Medication history sources: Patient  Location of interview: Phone  Adherence Assessment: Good    Significant changes made to the medication list:  None      Additional medication history information:   None    Medication reconciliation completed by provider prior to medication history? No    Time spent in this activity: 10 min      Prior to Admission medications    Medication Sig Last Dose Taking? Auth Provider   Cholecalciferol (VITAMIN D3 PO) Take 1,000 Units by mouth.   Past Week at Unknown time Yes Reported, Patient   lisinopril (ZESTRIL) 10 MG tablet Take 1 tablet (10 mg) by mouth daily 12/12/2020 at Unknown time Yes Cam Avila MD   multivitamin (CENTRUM SILVER) tablet Take 1 tablet by mouth daily Past Week at Unknown time Yes Reported, Patient   simvastatin (ZOCOR) 40 MG tablet TAKE ONE TABLET BY MOUTH ONCE DAILY AT BEDTIME 12/12/2020 Yes Cam Avila MD       The information provided in this note is only as accurate as the sources available at the time of the update(s).

## 2020-12-14 NOTE — ED NOTES
[facility]  ED Nurse Handoff Report    ED Chief complaint: Shortness of Breath      ED Diagnosis:   Final diagnoses:   Atrial flutter with rapid ventricular response (H)       Code Status: Admitting provider to address.     Allergies: No Known Allergies    Patient Story: Pt is a 70 year old male  with chronic shortness of breath who presents for evaluation of shortness of breath. Patient was seen on 11/23 by his Onocologist. The patient reports that yesterday he was feeling normal but today he has gradually worsening shortness of breath and it is to the point that he is unable to walk across a room without feeling winded. He states that along with this he has had some mild rhinorrhea but denies any known COVID exposures.      Focused Assessment:  Pt is awake, alert and orientated X 4. Patient states he was unaware that his heart is racing as well. He denies any current use of blood thinners, chest pain, abdominal pain, and any other known symptoms or concerns at this time.     Treatments and/or interventions provided: CT, Labs, IVF, Covid swab.     Patient's response to treatments and/or interventions: Tolerated well.     To be done/followed up on inpatient unit:      Does this patient have any cognitive concerns?: Awake, alert and orientated.     Activity level - Baseline/Home:  Independent  Activity Level - Current:   Independent    Patient's Preferred language: English   Needed?: No    Isolation: COVID r/o and special precautions  Infection: COVID r/o and special precautions  Patient tested for COVID 19 prior to admission: YES  Bariatric?: No    Vital Signs:   Vitals:    12/13/20 2045 12/13/20 2115 12/13/20 2130 12/13/20 2145   BP:  97/71 110/87    Pulse: 152 147 149 151   Resp: 11 17 21 22   Temp:       TempSrc:       SpO2:  92% 93% 91%   Weight:       Height:           Cardiac Rhythm:     Was the PSS-3 completed:   Yes  What interventions are required if any?               Family  Comments:   OBS brochure/video discussed/provided to patient/family: Yes              Name of person given brochure if not patient:               Relationship to patient:     For the majority of the shift this patient's behavior was Green.   Behavioral interventions performed were .    ED NURSE PHONE NUMBER: *[phone]

## 2020-12-14 NOTE — DISCHARGE SUMMARY
[facility]    Discharge Summary  Hospitalist    Date of Admission:  12/13/2020  Date of Discharge:  12/14/2020  Discharging Provider: Nathalie Mcdaniel DO  Date of Service (when I saw the patient): 12/14/20    Discharge Diagnoses   Dyspnea on exertion secondary to atrial flutter with RVR  Atrial flutter with RVR  Metastatic squamous cell lung carcinoma stage IV  Brain metastasis  Mild hyponatremia in the setting of malignancy -improved   Hypoglycemia--isolated  Chronic anemia, neutropenia due to malignancy and chemotherapy  Physical deconditioning in the setting of malignancy.  COVID-19 screening-negative    History of Present Illness   Luis Stapleton is an 70 year old male with history of lung cancer, noted to have shortness of breath and found to be in atrial flutter with RVR. Started on cardizem gtt, converted to normal sinus rhythm around 0300. Started on low dose beta blocker with stable HR and BPs. Echocardiogram with EF 55%. Due to elevated [id number] score--recommended for DOAC. Oncology approved DOAC initiation. Prescribed eliquis at time of discharge.    Hospital Course   Luis Stapleton was admitted on 12/13/2020.  The following problems were addressed during his hospitalization:    Dyspnea on exertion secondary to atrial flutter with RVR  Atrial flutter with RVR.- New onset   Evaluated by oncologist on 11/23, noted to have leg swelling and ultrasound done at that time was negative for DVT.  Reports leg swelling improved.  Possibly worsening shortness of breath with minimal ambulation. Denies any chest pain. EKG with atrial flutter, RVR. Chest x-ray with slightly decreased size of right perihilar mass. CT chest PE protocol -no PE. TSH 2.96.  D-dimer 2.6.  Troponin x2 undetectable. Echocardiogram with EF 55%, no wall motion abnormalities.  - cardizem gtt initially, weaned off as he converted to NSR overnight  - cardiology consulted, appreciate their input  - metoprolol 12.5 mg  twice daily started on discharge  - Hold PTA lisinopril on discharge given SBPs in 100s with initiation of metoprolol tartrate  - encouraged him to check his blood pressure twice daily on discharge (before meds and 2 hours after) to keep an eye on BP and HR  - If BPs stable in 120s-130s, can discuss with PCP if appropriate to resume lisinopril at that time.  - given hx HTN, age, AAA, he meets criteria for blood thinner- DOAC recommended and oncology agreeable to trial  - Eliquis 5mg BID prescribed on discharge, first dose given prior to discharge 12/14  - Follow up CBC in one week with oncology or PCP (whoever he gets his lab-work with)     Metastatic squamous cell lung carcinoma stage IV  Brain Metastasis  Follows with [place] [facility]. Reports has been started on a new chemotherapy agent [Docetaxel] by his oncologist last week. CT chest with significantly decreased size of the mass in the superior segment of right lower lung lobe since the previous study on 10/29/2019.  Right hilar lymphadenopathy appears more prominent.  Common hepatic duct dilatation without choledocholithiasis. There   is subtle decreased attenuation in the head of pancreas adjacent to   the common bile duct. A subtle mass in this region is difficult to   exclude but is not definitely seen. Attention to this region on future   exams is recommended. Dynamic MRI of the abdomen/pancreas and MRCP may   be helpful. Report per radiology   - Findings discussed with patient, oncology also reviewed imaging and compared to recent imaging from Nov 2020, appears the mass is stable in size.   - Underwent single fraction stereotactic radiosurgery on 11/17/20, not currently on steroids     Mild hyponatremia in the setting of malignancy -improved  sodium 131, improved to 133 with IVF overnight.     Hypoglycemia  Isolated glucose 63 noted with AM labs. He was not aware of low BG. Improved BG with orange juice and crackers while in hospital. Encouraged his  "wife to monitor his intake on discharge and encourage regular small snacks.  - If he feels \"off\" or weak, give him something with sugar in it (like hard candy) or orange juice and check his BP/HR.    Chronic anemia, acute neutropenia due to malignancy and chemotherapy.  Hemoglobin around 12, came down to 11.2 with IVF.  - WBC 2.5 prior to discharge (was 2.7 on admit with 0.1 absolute neutrophils)  - Protective isolation, no need for antibiotics at this time as is afebrile.     Physical deconditioning in the setting of malignancy.     COVID-19 screening-negative    Nathalie Mcdaniel, DO    Significant Results and Procedures   See below    Pending Results   NA    Code Status   Full Code       Primary Care Physician   Cam Avila    Physical Exam   Temp: 98.9  F (37.2  C) Temp src: Oral BP: 104/54 Pulse: 90   Resp: 16 SpO2: 96 % O2 Device: None (Room air)    Vitals:    12/13/20 1939 12/14/20 0500   Weight: 90.7 kg (200 lb) 89.4 kg (197 lb 1.6 oz)     Vital Signs with Ranges  Temp:  [97.8  F (36.6  C)-98.9  F (37.2  C)] 98.9  F (37.2  C)  Pulse:  [] 90  Resp:  [11-25] 16  BP: ()/(43-87) 104/54  SpO2:  [91 %-100 %] 96 %  I/O last 3 completed shifts:  In: 480 [P.O.:480]  Out: -     Patient seen and examined. Feels okay. A little overwhelmed seeming when discussing blood thinners and beta blockers for his heart. After review of new medications and recommendations, I called his wife and reiterated plan of care on discharge. He has follow-up with oncology in next 10 days with repeat blood work prior to his infusion and will be closely monitored. Stable for discharge.    Constitutional: Awake, alert, cooperative, no apparent distress.  Eyes: Conjunctiva and pupils examined and normal.  HEENT: Moist mucous membranes, normal dentition.  Respiratory: Clear to auscultation bilaterally, no crackles or wheezing.  Cardiovascular: Regular rate and rhythm, normal S1 and S2, and no murmur noted.  GI: Soft, non-distended, " non-tender, normal bowel sounds.  Lymph/Hematologic: No anterior cervical or supraclavicular adenopathy.  Skin: No rashes, no cyanosis, no edema.  Musculoskeletal: No joint swelling, erythema or tenderness.  Neurologic: Cranial nerves 2-12 intact, normal strength and sensation.  Psychiatric: Alert, oriented to person, place and time, no obvious anxiety or depression.    Discharge Disposition   Discharged to home  Condition at discharge: Stable    Consultations This Hospital Stay   CARDIOLOGY IP CONSULT  GASTROENTEROLOGY IP CONSULT  HEMATOLOGY & ONCOLOGY IP CONSULT  PHARMACY LIAISON FOR MEDICATION COVERAGE CONSULT    Time Spent on this Encounter   INathalie DO, personally saw the patient today and spent greater than 30 minutes discharging this patient.    Discharge Orders      CBC with platelets    Last Lab Result: Hemoglobin (g/dL)       Date                     Value                 12/14/2020               11.2 (L)         ----------     Reason for your hospital stay    Atrial flutter with rapid ventricular rate causing some shortness of breath.     Follow-up and recommended labs and tests     Follow up with primary care provider, Cam Avila, within 7 days for hospital follow- up and medication change.  The following labs/tests are recommended: CBC within one week (or just prior to infusion therapy).      Follow up with Oncology as previously scheduled for infusion and labs     Activity    Your activity upon discharge: activity as tolerated     Monitor and record    blood pressure and heart rate daily. Your blood pressure cuff should also monitor your heart rate. Check it once you wake up in the morning, before you take your medications and then about 2 hours later to see how your blood pressure and heart rate tolerate the medication.     Discharge Instructions    1. Check your blood pressures before and after your morning medications at least for the first week for monitoring. You can also check your  blood pressure if you feel weird.    2. Take eliquis 5mg twice daily, usually with meals (breakfast and dinner).    3. Take metoprolol 12.5mg twice daily (morning and night) for heart rate and blood pressure control    4. Check your blood count with oncology labs within the next week-10 days    5. Do not resume your lisinopril at this time. Goal blood pressure is <100 for the top number overall. If you have blood pressures that are 120-130s after taking metoprolol, you can discuss with your PCP if it is safe to resume your lisinopril at full (10mg) or half (5mg) dosing.     Full Code     Miscellaneous DME Order    DME Documentation:   Describe the reason for need to support medical necessity: home blood pressure monitoring    I, the undersigned, certify that the above prescribed supplies are medically necessary for this patient and is both reasonable and necessary in reference to accepted standards of medical and necessary in reference to accepted standards of medical practice in the treatment of this patient's condition and is not prescribed as a convenience.     Blood pressure monitor     Diet    Follow this diet upon discharge: Regular Diet Adult     Discharge Medications   Current Discharge Medication List      START taking these medications    Details   apixaban ANTICOAGULANT (ELIQUIS ANTICOAGULANT) 5 MG tablet Take 1 tablet (5 mg) by mouth 2 times daily  Qty: 60 tablet, Refills: 0    Comments: Future refills by PCP Dr. Cam Avila with phone number [phone].  Associated Diagnoses: Atrial flutter with rapid ventricular response (H)      metoprolol tartrate (LOPRESSOR) 25 MG tablet Take 0.5 tablets (12.5 mg) by mouth 2 times daily  Qty: 30 tablet, Refills: 0    Comments: Future refills by PCP Dr. Cam Avila with phone number [phone].  Associated Diagnoses: Atrial flutter with rapid ventricular response (H)         CONTINUE these medications which have CHANGED    Details   lisinopril (ZESTRIL) 10 MG  tablet Take 1 tablet (10 mg) by mouth daily  Qty: 90 tablet, Refills: 1    Associated Diagnoses: Benign essential hypertension         CONTINUE these medications which have NOT CHANGED    Details   Cholecalciferol (VITAMIN D3 PO) Take 1,000 Units by mouth.        multivitamin (CENTRUM SILVER) tablet Take 1 tablet by mouth daily      simvastatin (ZOCOR) 40 MG tablet TAKE ONE TABLET BY MOUTH ONCE DAILY AT BEDTIME  Qty: 90 tablet, Refills: 3    Associated Diagnoses: CARDIOVASCULAR SCREENING; LDL GOAL LESS THAN 100           Allergies   No Known Allergies  Data   Most Recent 3 CBC's:  Recent Labs   Lab Test 12/14/20 0530 12/13/20 1954 04/23/19  0802   WBC 2.5* 2.7*  --    HGB 11.2* 12.2* 14.0   MCV 92 91  --     284  --       Most Recent 3 BMP's:  Recent Labs   Lab Test 12/14/20 0530 12/13/20 1954 12/27/19  0947 12/11/19 04/23/19  0802    131*  --   --  138   POTASSIUM 3.9 3.9 4.1 5.0 4.5   CHLORIDE 102 100  --   --  105   CO2 20 22  --   --  29   BUN 10 12  --   --  17   CR 0.82 0.88  --  1.00 0.99   ANIONGAP 11 9  --   --  4   HORACE 7.7* 8.3*  --   --  9.1   GLC 63* 87  --  100* 89     Most Recent 2 LFT's:  Recent Labs   Lab Test 12/14/20 0530 12/11/19 04/23/19  0802   AST 25 119* 21   ALT 18 93* 18   ALKPHOS 28*  --  34*   BILITOTAL 0.4  --  0.5     Most Recent INR's and Anticoagulation Dosing History:  Anticoagulation Dose History     Recent Dosing and Labs Latest Ref Rng & Units 3/4/2009 12/11/2019    INR <1.3 1.28(H) 1.3(A)        Most Recent 3 Troponin's:  Recent Labs   Lab Test 12/14/20 0530 12/13/20 1954   TROPI <0.015 <0.015     Most Recent Cholesterol Panel:  Recent Labs   Lab Test 12/14/20 0530   CHOL 99   LDL 46   HDL 30*   TRIG 115     Most Recent 6 Bacteria Isolates From Any Culture (See EPIC Reports for Culture Details):No lab results found.  Most Recent TSH, T4 and A1c Labs:  Recent Labs   Lab Test 12/13/20 1954   TSH 2.96     Results for orders placed or performed during the  hospital encounter of 12/13/20   XR Chest Port 1 View    Narrative    CHEST PORTABLE ONE VIEW   12/13/2020 8:14 PM     HISTORY: SOB.    COMPARISON: CT chest dated 10/19/2019, chest x-rays dated 10/29/2019.    FINDINGS:  The right perihilar midlung mass has significantly  decreased in size since prior chest x-rays and CT. This now measures  approximately 4.6 x 2.5 cm in the transverse and craniocaudal  dimensions respectively as compared to 6.8 x 5.6 cm in the same  dimensions on the prior study dated 10/29/2019.    There is a left chest wall Ofmjmc-x-Takf with subclavian central  venous catheter projected in the appropriate positions. Left lung is  clear. No new right lung abnormality is seen. No pneumothorax,  significant pleural fluid collection, or acute osseous fracture.      Impression    IMPRESSION:  1. Significantly decreased size of right perihilar mass since the  prior studies.  2. New left-sided Uuszba-o-Ynhr since the prior studies. No  pneumothorax.  3. No new infiltrate is seen.    MIREYA DAS MD   CT Chest Pulmonary Embolism w Contrast    Addendum: 12/13/2020    JENNIFER KOTHARI    Accession # [id number]    The original report on this patient was dictated by me.  The common  bile duct is abnormally distended in the upper abdomen. This was  incorrectly described as the common hepatic duct in the first sentence  of the upper abdominal portion of the findings and in #5 of the  impression of the original report.      (Date of Addendum: 12/13/2020 10:02 PM)      MIREYA DAS MD      Narrative    CT CHEST PULMONARY EMBOLISM WITH CONTRAST 12/13/2020 9:14 PM    CLINICAL HISTORY: SOB, lung cancer, rule out PE.    TECHNIQUE: CT angiogram chest during arterial phase injection IV  contrast. 2D and 3D MIP reconstructions were performed by the CT  technologist. Dose reduction techniques were used.     CONTRAST: 73mL Isovue-370    COMPARISON: Chest x-rays dated 12/13/2020 and 10/29/2019 and CT chest  dated  10/29/2019.    FINDINGS:  ANGIOGRAM CHEST: Pulmonary arteries are normal caliber and negative  for pulmonary emboli. Thoracic aorta is negative for dissection or  aneurysm. There are thoracic aortic calcifications. No CT evidence of  right heart strain.    LUNGS AND PLEURA: Spiculated irregular mass in the suprasellar right  lower lung lobe measures up to 5.4 x 3.8 x 2.4 cm in the AP,  transverse and craniocaudal dimensions respectively as compared to 6.4  x 5.9 x 5.6 cm on the prior study dated 10/29/2019. Morphology has  changed and there are more spiculations along the edge and there is  also now a nodular appearance along the anterior aspect of the mass.  This represents significant improvement but incomplete resolution of  malignancy in the superior segment of the right lower lung lobe.    No other significant nodularity, mass, infiltrate, effusion, or  pneumothorax is identified.    MEDIASTINUM/AXILLAE: There is right hilar lymphadenopathy measuring up  to 1.5 cm in short axis. This appears more prominent than on the prior  study but is difficult to compare due to lack of IV contrast on the  prior study. No mediastinal, hilar, or left axillary adenopathy is  seen. The heart is grossly normal in size. There are coronary artery  and aortic calcifications.    UPPER ABDOMEN: There is marked common hepatic duct dilatation  measuring up to 1.9 cm in diameter. This is more prominent than on the  prior study. No definite choledocholithiasis is seen. There is mild  pancreatic ductal dilatation. There is subtle decreased attenuation in  the head of pancreas near the common bile duct. Mass in this region is  difficult to entirely exclude although is not definitely seen. No  significant intrahepatic biliary ductal dilatation is identified. No  cholelithiasis. Gallbladder is unremarkable. Visualized portions of  the upper abdominal contents are otherwise grossly unremarkable.    MUSCULOSKELETAL: No aggressive osseous  lesions or acute osseous  fractures are seen.      Impression    IMPRESSION:  1.  Significant decreased size of mass in superior segment of right  lower lung lobe since the prior study dated 10/29/2019. There is  somewhat difficult morphology of this mass, however, with spiculated  margins and now a nodular excrescence extending anteriorly. This  should be closely followed.  2.  No new nodule is seen within the lungs.  3.  Right hilar lymphadenopathy appears more prominent than on the  prior CT but is difficult to compare due to the lack of IV contrast on  the prior CT.  4.  No evidence for pulmonary artery embolism.  5.  Common hepatic duct dilatation without choledocholithiasis. There  is subtle decreased attenuation in the head of pancreas adjacent to  the common bile duct. A subtle mass in this region is difficult to  exclude but is not definitely seen. Attention to this region on future  exams is recommended. Dynamic MRI of the abdomen/pancreas and MRCP may  be helpful.    MIREYA DAS MD   Echocardiogram Complete    Narrative    [id number]  [id number]  [id number]  [id number]^SANA^CHELA           [facility]  Echocardiography Laboratory  [address] [place] [address]        Name: JENNIFER KOTHARI  MRN: [id number]  : 1950  Study Date: 2020 09:16 AM  Age: 70 yrs  Gender: Male  Patient Location: [facility]  Reason For Study: Atrial Fibrillation  Ordering Physician: CHELA HOOD  Referring Physician: JAYLEN MADISON  Performed By: Conor Hatfield RDCS     BSA: 2.1 m2  Height: 72 in  Weight: 200 lb  HR: 82  BP: 101/58 mmHg  _____________________________________________________________________________  __        Procedure  Complete Portable Echo Adult. Optison (NDC #6574-9771) given intravenously.  _____________________________________________________________________________  __        Interpretation Summary     1. The left ventricle is normal in structure, function and size. The  visual  ejection fraction is estimated at 55%.  2. The right ventricle is normal in structure, function and size.  3. No valve disease.     No previous echo for comparison.  _____________________________________________________________________________  __        Left Ventricle  The left ventricle is normal in structure, function and size. There is normal  left ventricular wall thickness. The visual ejection fraction is estimated at  55%. Left ventricular diastolic function is indeterminate. Normal left  ventricular wall motion.     Right Ventricle  The right ventricle is normal in structure, function and size.     Atria  Normal left atrial size. Right atrial size is normal. There is no atrial shunt  seen.     Mitral Valve  There is no mitral regurgitation noted.        Tricuspid Valve  There is mild (1+) tricuspid regurgitation. The right ventricular systolic  pressure is approximated at 28.9 mmHg plus the right atrial pressure.     Aortic Valve  The aortic valve is normal in structure and function.     Pulmonic Valve  The pulmonic valve is normal in structure and function.     Vessels  Normal ascending, transverse (arch), and descending aorta. The inferior vena  cava was normal in size with preserved respiratory variability.     Pericardium  There is no pericardial effusion.        Rhythm  Sinus rhythm was noted.  _____________________________________________________________________________  __  MMode/2D Measurements & Calculations  IVSd: 0.78 cm     LVIDd: 3.9 cm  LVIDs: 1.9 cm  LVPWd: 0.90 cm  FS: 52.3 %  LV mass(C)d: 95.7 grams  LV mass(C)dI: 44.9 grams/m2  Ao root diam: 3.5 cm  LA dimension: 3.4 cm  LA/Ao: 0.97  LA Volume (BP): 61.6 ml  LA Volume Index (BP): 28.9 ml/m2  RWT: 0.46           Doppler Measurements & Calculations  MV E max orville: 55.9 cm/sec  MV A max orville: 42.5 cm/sec  MV E/A: 1.3  MV dec time: 0.23 sec  PA acc time: 0.11 sec  TR max orville: 268.8 cm/sec  TR max P.9 mmHg  E/E' avg: 3.6  Lateral  E/e': 3.8  [facility] E/e': 3.5           _____________________________________________________________________________  __           Report approved by: Anna Parham 12/14/2020 11:21 AM

## 2020-12-14 NOTE — PHARMACY-RX INSURANCE COVERAGE
Anticoagulation coverage check.  Patient has Medicare D through [facility] with $365 unmet deductible.    Xarelto/Eliquis   December $413, or Discharge Pharmacy can dispense 1 month free upon receipt of RX.   January 2021 $442 (fulfills $395 deductible)   Feb-Sept $47/mo   Oct-Dec $114/mo (coverage gap)     Jantoven (warfarin): $7/mo      -FABIAN Appiah, Pharmacy Technician/Liaison, Discharge Pharmacy [phone]

## 2020-12-16 NOTE — TELEPHONE ENCOUNTER
Patient was discharged from [facility] on 12/14/2020 after being treated for Atrial Flutter With Rapid Ventricular Response (H). Triage please follow up with patient.      .Avis BELLO    [facility]

## 2020-12-16 NOTE — TELEPHONE ENCOUNTER
Non detailed message left for pt to return call to clinic and ask to speak with a triage nurse.    Post Discharge Medication Reconciliation Status: unable to reconcile discharge medications due to pt unavailable.      Shayla HIGGINS RN  EP Triage

## 2020-12-17 NOTE — TELEPHONE ENCOUNTER
"Hospital/TCU/ED for chronic condition Discharge Protocol    \"Hi, my name is Shayla Dubon RN, a registered nurse, and I am calling from [facility].  I am calling to follow up and see how things are going for you after your recent emergency visit/hospital/TCU stay.\"    Tell me how you are doing now that you are home?\" spoke with wife who states pt is going through a rough time now.  Not able to sleep at night but doing ok since home from the hospital      Discharge Instructions    \"Let's review your discharge instructions.  What is/are the follow-up recommendations?  Pt. Response: will follow up with Dr. Arreguin    \"Has an appointment with your primary care provider been scheduled?\"   No (schedule appointment)    \"When you see the provider, I would recommend that you bring your medications with you.\"    Medications    \"Tell me what changed about your medicines when you discharged?\"    Changes to chronic meds?    0-1    \"What questions do you have about your medications?\"    None     New diagnoses of heart failure, COPD, diabetes, or MI?    No              Post Discharge Medication Reconciliation Status: discharge medications reconciled, continue medications without change.    Was MTM referral placed (*Make sure to put transitions as reason for referral)?   no  Call Summary    \"What questions or concerns do you have about your recent visit and your follow-up care?\"     none    \"If you have questions or things don't continue to improve, we encourage you contact us through the main clinic number (give number).  Even if the clinic is not open, triage nurses are available 24/7 to help you.     We would like you to know that our clinic has extended hours (provide information).  We also have urgent care (provide details on closest location and hours/contact info)\"      \"Thank you for your time and take care!\"             "

## 2021-01-01 ENCOUNTER — HOSPITAL ENCOUNTER (OUTPATIENT)
Dept: ULTRASOUND IMAGING | Facility: CLINIC | Age: 71
Discharge: HOME | End: 2021-06-03
Attending: INTERNAL MEDICINE | Admitting: INTERNAL MEDICINE
Payer: COMMERCIAL

## 2021-01-01 ENCOUNTER — TELEPHONE (OUTPATIENT)
Dept: FAMILY MEDICINE | Facility: CLINIC | Age: 71
End: 2021-01-01

## 2021-01-01 ENCOUNTER — TRANSFERRED RECORDS (OUTPATIENT)
Dept: HEALTH INFORMATION MANAGEMENT | Facility: CLINIC | Age: 71
End: 2021-01-01

## 2021-01-01 ENCOUNTER — IMMUNIZATION (OUTPATIENT)
Dept: NURSING | Facility: CLINIC | Age: 71
End: 2021-01-01
Attending: INTERNAL MEDICINE
Payer: COMMERCIAL

## 2021-01-01 ENCOUNTER — IMMUNIZATION (OUTPATIENT)
Dept: NURSING | Facility: CLINIC | Age: 71
End: 2021-01-01
Payer: COMMERCIAL

## 2021-01-01 ENCOUNTER — VIRTUAL VISIT (OUTPATIENT)
Dept: FAMILY MEDICINE | Facility: CLINIC | Age: 71
End: 2021-01-01
Payer: COMMERCIAL

## 2021-01-01 ENCOUNTER — HEALTH MAINTENANCE LETTER (OUTPATIENT)
Age: 71
End: 2021-01-01

## 2021-01-01 ENCOUNTER — OFFICE VISIT (OUTPATIENT)
Dept: FAMILY MEDICINE | Facility: CLINIC | Age: 71
End: 2021-01-01
Payer: COMMERCIAL

## 2021-01-01 ENCOUNTER — OFFICE VISIT (OUTPATIENT)
Dept: CARDIOLOGY | Facility: CLINIC | Age: 71
End: 2021-01-01
Attending: FAMILY MEDICINE
Payer: COMMERCIAL

## 2021-01-01 ENCOUNTER — TELEPHONE (OUTPATIENT)
Dept: CARDIOLOGY | Facility: CLINIC | Age: 71
End: 2021-01-01

## 2021-01-01 VITALS
HEIGHT: 72 IN | OXYGEN SATURATION: 100 % | DIASTOLIC BLOOD PRESSURE: 82 MMHG | TEMPERATURE: 97.3 F | SYSTOLIC BLOOD PRESSURE: 136 MMHG | HEART RATE: 55 BPM | RESPIRATION RATE: 16 BRPM | BODY MASS INDEX: 27.09 KG/M2 | WEIGHT: 200 LBS

## 2021-01-01 VITALS
WEIGHT: 197.7 LBS | SYSTOLIC BLOOD PRESSURE: 91 MMHG | HEART RATE: 94 BPM | HEIGHT: 72 IN | DIASTOLIC BLOOD PRESSURE: 63 MMHG | BODY MASS INDEX: 26.78 KG/M2

## 2021-01-01 DIAGNOSIS — C34.31 MALIGNANT NEOPLASM OF LOWER LOBE OF RIGHT LUNG (H): ICD-10-CM

## 2021-01-01 DIAGNOSIS — E78.5 HYPERLIPIDEMIA LDL GOAL <160: ICD-10-CM

## 2021-01-01 DIAGNOSIS — I71.40 ABDOMINAL AORTIC ANEURYSM (AAA) WITHOUT RUPTURE (H): ICD-10-CM

## 2021-01-01 DIAGNOSIS — I10 BENIGN ESSENTIAL HYPERTENSION: Primary | ICD-10-CM

## 2021-01-01 DIAGNOSIS — I48.92 ATRIAL FLUTTER WITH RAPID VENTRICULAR RESPONSE (H): ICD-10-CM

## 2021-01-01 DIAGNOSIS — Z00.00 ENCOUNTER FOR MEDICARE ANNUAL WELLNESS EXAM: ICD-10-CM

## 2021-01-01 DIAGNOSIS — I48.92 ATRIAL FLUTTER, UNSPECIFIED TYPE (H): ICD-10-CM

## 2021-01-01 DIAGNOSIS — C79.31 SECONDARY MALIGNANT NEOPLASM OF BRAIN (H): ICD-10-CM

## 2021-01-01 DIAGNOSIS — R60.0 EDEMA LEG: ICD-10-CM

## 2021-01-01 DIAGNOSIS — I48.3 TYPICAL ATRIAL FLUTTER (H): Primary | ICD-10-CM

## 2021-01-01 DIAGNOSIS — R53.83 OTHER FATIGUE: ICD-10-CM

## 2021-01-01 LAB
ALBUMIN SERPL-MCNC: 2.9 G/DL (ref 3.4–5)
ALP SERPL-CCNC: 68 U/L (ref 40–150)
ALT SERPL W P-5'-P-CCNC: 17 U/L (ref 0–70)
ANION GAP SERPL CALCULATED.3IONS-SCNC: 2 MMOL/L (ref 3–14)
AST SERPL W P-5'-P-CCNC: 20 U/L (ref 0–45)
BILIRUB SERPL-MCNC: 0.4 MG/DL (ref 0.2–1.3)
BUN SERPL-MCNC: 13 MG/DL (ref 7–30)
CALCIUM SERPL-MCNC: 8.7 MG/DL (ref 8.5–10.1)
CHLORIDE SERPL-SCNC: 104 MMOL/L (ref 94–109)
CHOLEST SERPL-MCNC: 136 MG/DL
CO2 SERPL-SCNC: 35 MMOL/L (ref 20–32)
CREAT SERPL-MCNC: 1.06 MG/DL (ref 0.66–1.25)
GFR SERPL CREATININE-BSD FRML MDRD: 70 ML/MIN/{1.73_M2}
GLUCOSE SERPL-MCNC: 82 MG/DL (ref 70–99)
HDLC SERPL-MCNC: 65 MG/DL
LDLC SERPL CALC-MCNC: 57 MG/DL
NONHDLC SERPL-MCNC: 71 MG/DL
POTASSIUM SERPL-SCNC: 3.3 MMOL/L (ref 3.4–5.3)
PROT SERPL-MCNC: 6.6 G/DL (ref 6.8–8.8)
SODIUM SERPL-SCNC: 141 MMOL/L (ref 133–144)
TRIGL SERPL-MCNC: 69 MG/DL

## 2021-01-01 PROCEDURE — 36415 COLL VENOUS BLD VENIPUNCTURE: CPT | Performed by: FAMILY MEDICINE

## 2021-01-01 PROCEDURE — 99397 PER PM REEVAL EST PAT 65+ YR: CPT | Mod: GW | Performed by: FAMILY MEDICINE

## 2021-01-01 PROCEDURE — 99214 OFFICE O/P EST MOD 30 MIN: CPT | Mod: 25 | Performed by: FAMILY MEDICINE

## 2021-01-01 PROCEDURE — 0002A PR COVID VAC PFIZER DIL RECON 30 MCG/0.3 ML IM: CPT

## 2021-01-01 PROCEDURE — 93970 EXTREMITY STUDY: CPT

## 2021-01-01 PROCEDURE — 80061 LIPID PANEL: CPT | Mod: GW | Performed by: FAMILY MEDICINE

## 2021-01-01 PROCEDURE — 91300 PR COVID VAC PFIZER DIL RECON 30 MCG/0.3 ML IM: CPT

## 2021-01-01 PROCEDURE — 0001A PR COVID VAC PFIZER DIL RECON 30 MCG/0.3 ML IM: CPT

## 2021-01-01 PROCEDURE — 80053 COMPREHEN METABOLIC PANEL: CPT | Mod: GW | Performed by: FAMILY MEDICINE

## 2021-01-01 PROCEDURE — 99214 OFFICE O/P EST MOD 30 MIN: CPT | Performed by: INTERNAL MEDICINE

## 2021-01-01 PROCEDURE — 99214 OFFICE O/P EST MOD 30 MIN: CPT | Mod: 95 | Performed by: FAMILY MEDICINE

## 2021-01-01 RX ORDER — HYDROCORTISONE 10 MG/1
TABLET ORAL
COMMUNITY
Start: 2021-01-01

## 2021-01-01 RX ORDER — SIMVASTATIN 40 MG
TABLET ORAL
Qty: 90 TABLET | Refills: 3 | Status: SHIPPED | OUTPATIENT
Start: 2021-01-01

## 2021-01-01 RX ORDER — SULFAMETHOXAZOLE/TRIMETHOPRIM 800-160 MG
TABLET ORAL
COMMUNITY
Start: 2021-01-01

## 2021-01-01 RX ORDER — METOPROLOL TARTRATE 25 MG/1
12.5 TABLET, FILM COATED ORAL 2 TIMES DAILY
Qty: 90 TABLET | Refills: 3 | Status: SHIPPED | OUTPATIENT
Start: 2021-01-01

## 2021-01-01 RX ORDER — FUROSEMIDE 20 MG
TABLET ORAL
COMMUNITY
Start: 2021-01-01

## 2021-01-01 ASSESSMENT — ENCOUNTER SYMPTOMS
DIARRHEA: 0
CONSTIPATION: 0
COUGH: 0
CHILLS: 0
NAUSEA: 0
ARTHRALGIAS: 0
JOINT SWELLING: 0
PARESTHESIAS: 0
HEMATURIA: 0
FREQUENCY: 1
DYSURIA: 0
PALPITATIONS: 0
NERVOUS/ANXIOUS: 0
DIZZINESS: 0
HEADACHES: 0
FEVER: 0
HEMATOCHEZIA: 0
MYALGIAS: 0
SHORTNESS OF BREATH: 0
HEARTBURN: 0
EYE PAIN: 0
WEAKNESS: 0
SORE THROAT: 0
ABDOMINAL PAIN: 0

## 2021-01-01 ASSESSMENT — MIFFLIN-ST. JEOR
SCORE: 1695.19
SCORE: 1689.76

## 2021-01-01 ASSESSMENT — ACTIVITIES OF DAILY LIVING (ADL): CURRENT_FUNCTION: NO ASSISTANCE NEEDED

## 2021-01-12 NOTE — PROGRESS NOTES
Jay is a 70 year old who is being evaluated via a billable video visit.      How would you like to obtain your AVS? MyChart  If the video visit is dropped, the invitation should be resent by: Text to cell phone: [phone]  Will anyone else be joining your video visit? No      Video Start Time:3:20      Subjective     Jay is a 70 year old who presents to clinic today for the following health issues    HPI     Patient was recently hospitalized secondary to atrial flutter.  He was in the hospital for few days and cardiology evaluated him.  Due to his age the risk of stroke was high as he was started on anticoagulant Eliquis.  He is taking it but some question about cost and continue using that.  His lisinopril was stopped along with metoprolol was added.  Denies any chest pains no shortness of breath.  As per his oncology his tumor has slightly spread with some mets in the brain area.  Hypertension Follow-up      Do you check your blood pressure regularly outside of the clinic? Yes at oncology    Are you following a low salt diet? Yes    Are your blood pressures ever more than 140 on the top number (systolic) OR more   than 90 on the bottom number (diastolic), for example 140/90? No      How many servings of fruits and vegetables do you eat daily?  2-3    On average, how many sweetened beverages do you drink each day (Examples: soda, juice, sweet tea, etc.  Do NOT count diet or artificially sweetened beverages)?   0    How many days per week do you exercise enough to make your heart beat faster? 3 or less    How many minutes a day do you exercise enough to make your heart beat faster? 9 or less    How many days per week do you miss taking your medication? 0  Pt would like to discuss an alternative for Eliquis as it is too expensive.        Review of Systems   Constitutional, HEENT, cardiovascular, pulmonary, gi and gu systems are negative, except as otherwise noted.      Objective           Vitals:  No vitals were  obtained today due to virtual visit.    Physical Exam   GENERAL: Healthy, alert and no distress  EYES: Eyes grossly normal to inspection.  No discharge or erythema, or obvious scleral/conjunctival abnormalities.  RESP: No audible wheeze, cough, or visible cyanosis.  No visible retractions or increased work of breathing.    SKIN: Visible skin clear. No significant rash, abnormal pigmentation or lesions.  NEURO: Cranial nerves grossly intact.  Mentation and speech appropriate for age.  PSYCH: Mentation appears normal, affect normal/bright, judgement and insight intact, normal speech and appearance well-groomed.        ICD-10-CM    1. Benign essential hypertension  I10    2. Atrial flutter, unspecified type (H)  I48.92 CARDIOLOGY EVAL ADULT REFERRAL   3. Malignant neoplasm of lower lobe of right lung (H)  C34.31    4. Atrial flutter with rapid ventricular response (H)  I48.92 metoprolol tartrate (LOPRESSOR) 25 MG tablet     apixaban ANTICOAGULANT (ELIQUIS ANTICOAGULANT) 5 MG tablet     Discussed with the patient due to his atrial flutter and he has slight high risk of getting stroke which can cause some issues.  I would not suggest stopping Eliquis however he needs to talk to cardiology referral provided to discuss if any alternative would be appropriate sometime other medications including Xarelto may be cheaper but I would have that discussion with cardiology so that they can decide if this is appropriate.  Given his underlying issues warfarin may not be very appropriate due to continue monitoring.    He can continue his metoprolol .  Denies any chest pain no shortness of breath blood pressure is stable.  He can hold on lisinopril  Continue with oncology.  We have some discussion with the patient regarding ongoing care he is motivated and getting the best out of his care.    Video-Visit Details    Type of service:  Video Visit/phone visit    Video End Time:3:45    Originating Location (pt. Location): Home    Distant  Location (provider location):  [facility]     Platform used for Video Visit: Chintan  Part of the visit was done due to the phone due to some technical errors in [employer]

## 2021-01-13 PROBLEM — I48.3 TYPICAL ATRIAL FLUTTER (H): Status: ACTIVE | Noted: 2020-01-01

## 2021-01-13 NOTE — LETTER
1/13/2021    Cam Avila MD  [address]  [place] [address]    RE: Luis AVRIL Stapleton       Dear Colleague,    I had the pleasure of seeing Luis Stapleton in the [facility].    HPI and Plan:   I had the pleasure of seeing Luis Stapleton in cardiology clinic in follow-up.  I saw him recently when he was hospitalized for Arrhythmia.  Typical atrial flutter.  He was given IV diltiazem and converted to sinus rhythm.  He is now on low-dose metoprolol tartrate 12.5 mg twice daily.  Because blood pressures were soft, his lisinopril was discontinued.  He was started on Eliquis because chads vascular score was 3.    He also has diagnosis of metastatic lung cancer.  Initially was on a combination of Keytruda, Abraxane and  Carboplatin.  Initially tolerated the drugs well and had some response but in November he had new liver metastasis and progression.  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  He had a repeat brain MRI on Tuesday and results are pending    He is now on experimental therapy with possible IV docetaxel for progressive metastatic lung cancer.    He denies any recurrent palpitations dizziness or syncope.  He has some running nose at this time.  He has some fatigue.    His main concern has been cost of Eliquis.  He space around $500 for that.  He asked me if there was any alternative.    Physical exam  See below    Impression    1.  Episode of typical atrial flutter in December 2020, converted to sinus rhythm with diltiazem and now on low-dose metoprolol.  Ideally, he would need anticoagulation because his chads vascular score is 3 but given his previous history of brain metastases and the fact that his cancer is progressing, I am worried that he is at risk for recurrent brain metastasis and that would be relative contraindication to using anticoagulants.  With that in mind, I discussed his case with the oncologist, Dr. Cruz  Dalton.  His oncologist agreed that he is at risk for progression and possibility of recurrent brain metastasis.  Given this, I will stop his Eliquis and instead put him on baby aspirin.  He understands that without anticoagulation is at risk of stroke but the risk of Eliquis and brain bleeding increases if he has progression of his lung cancer and recurrent brain metastasis.  Since my discussion with his oncologist occurred after he left the office, I will asked my nurse to call him to make that change of stopping Eliquis and starting baby aspirin.    2.  Metastatic lung cancer, management per oncologist    3.  History of abdominal aortic aneurysm repair, last ultrasound in March 2020 showed aortic graft was widely patent.  Continue statins.    Thank you for allowing us to participate in the care of this nice patient.  I will see him back in follow-up in 6 months or earlier as needed.    Sincerely,    Geoffrey Garzon MD        Orders Placed This Encounter   Procedures     Follow-Up with Cardiologist       No orders of the defined types were placed in this encounter.      There are no discontinued medications.      Encounter Diagnoses   Name Primary?     Typical atrial flutter (H)      Malignant neoplasm of lower lobe of right lung (H) Yes     Abdominal aortic aneurysm (AAA) without rupture (H)        CURRENT MEDICATIONS:  Current Outpatient Medications   Medication Sig Dispense Refill     apixaban ANTICOAGULANT (ELIQUIS ANTICOAGULANT) 5 MG tablet Take 1 tablet (5 mg) by mouth 2 times daily 60 tablet 0     Cholecalciferol (VITAMIN D3 PO) Take 1,000 Units by mouth.         metoprolol tartrate (LOPRESSOR) 25 MG tablet Take 0.5 tablets (12.5 mg) by mouth 2 times daily 90 tablet 3     multivitamin (CENTRUM SILVER) tablet Take 1 tablet by mouth daily       simvastatin (ZOCOR) 40 MG tablet TAKE ONE TABLET BY MOUTH ONCE DAILY AT BEDTIME 90 tablet 3       ALLERGIES   No Known Allergies    PAST MEDICAL HISTORY:  Past Medical  History:   Diagnosis Date     Aneurysm (H)     ABDOMINAL      Calculus of kidney      Diverticulosis of colon (without mention of hemorrhage)      Hypertension      Lung cancer (H)      Malignant neoplasm of bladder, part unspecified      Other and unspecified hyperlipidemia        PAST SURGICAL HISTORY:  Past Surgical History:   Procedure Laterality Date     ABDOMEN SURGERY      AAA     ABDOMEN SURGERY  12/10/2019    liver biopsies and removal of nodules     BRONCHOSCOPY       CYSTOSCOPY, BIOPSY BLADDER, COMBINED  2012    Procedure: COMBINED CYSTOSCOPY, BIOPSY BLADDER;;  Surgeon: Jonas Young MD;  Location:  OR     CYSTOSCOPY, RETROGRADES, COMBINED  2012    Procedure: COMBINED CYSTOSCOPY, RETROGRADES;  CYSTOSCOPY, BILATERAL RETROGRADES, BLADDER BIOPSY, FULGURATION;  Surgeon: Jonas Young MD;  Location:  OR     CYSTOSCOPY, TRANSURETHRAL RESECTION (TUR) TUMOR BLADDER, COMBINED       HERNIA REPAIR       INSERT PORT VASCULAR ACCESS N/A 2019    Procedure: INSERTION, VASCULAR ACCESS PORT FLAT PLATE X RAY;  Surgeon: Pradip Sosa MD;  Location: [facility] OR     REPAIR ANEURYSM ABDOMINAL AORTA      2009       FAMILY HISTORY:  Family History   Problem Relation Age of Onset     Abdominal Aortic Aneurysm Father      Abdominal Aortic Aneurysm Brother        SOCIAL HISTORY:  Social History     Socioeconomic History     Marital status:      Spouse name: None     Number of children: None     Years of education: None     Highest education level: None   Occupational History     None   Social Needs     Financial resource strain: None     Food insecurity     Worry: None     Inability: None     Transportation needs     Medical: None     Non-medical: None   Tobacco Use     Smoking status: Former Smoker     Packs/day: 1.00     Years: 45.00     Pack years: 45.00     Quit date: 3/4/2008     Years since quittin.8     Smokeless tobacco: Never Used   Substance and Sexual Activity     Alcohol  use: Yes     Comment: 1 drink month     Drug use: Never     Sexual activity: Yes     Partners: Female   Lifestyle     Physical activity     Days per week: None     Minutes per session: None     Stress: None   Relationships     Social connections     Talks on phone: None     Gets together: None     Attends Restorationism service: None     Active member of club or organization: None     Attends meetings of clubs or organizations: None     Relationship status: None     Intimate partner violence     Fear of current or ex partner: None     Emotionally abused: None     Physically abused: None     Forced sexual activity: None   Other Topics Concern     Parent/sibling w/ CABG, MI or angioplasty before 65F 55M? Not Asked   Social History Narrative     None       Review of Systems:  Skin:  Negative       Eyes:  Negative      ENT:  Positive for   runny nose  Respiratory:  Positive for dyspnea on exertion lung cancer   Cardiovascular:  Negative;palpitations;chest pain;lightheadedness;dizziness;syncope or near-syncope;cyanosis;edema Positive for;fatigue edema in ankles  Gastroenterology: Positive for poor appetite    Genitourinary:  Negative      Musculoskeletal:  Negative      Neurologic:  Negative      Psychiatric:  Negative      Heme/Lymph/Imm:  Positive for weight loss    Endocrine:  Negative        Physical Exam:  Vitals: BP 91/63   Pulse 94   Ht 1.829 m (6')   Wt 89.7 kg (197 lb 11.2 oz)   BMI 26.81 kg/m      Constitutional:           Skin:             Head:           Eyes:           Lymph:      ENT:           Neck:           Respiratory:            Cardiac:                                                           GI:           Extremities and Muscular Skeletal:                 Neurological:           Psych:           Thank you for allowing me to participate in the care of your patient.    Sincerely,     Geoffrey Garzon MD     [facility]

## 2021-01-13 NOTE — PROGRESS NOTES
HPI and Plan:   I had the pleasure of seeing Luis Stapleton in cardiology clinic in follow-up.  I saw him recently when he was hospitalized for Arrhythmia.  Typical atrial flutter.  He was given IV diltiazem and converted to sinus rhythm.  He is now on low-dose metoprolol tartrate 12.5 mg twice daily.  Because blood pressures were soft, his lisinopril was discontinued.  He was started on Eliquis because chads vascular score was 3.    He also has diagnosis of metastatic lung cancer.  Initially was on a combination of Keytruda, Abraxane and  Carboplatin.  Initially tolerated the drugs well and had some response but in November he had new liver metastasis and progression.  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  He had a repeat brain MRI on Tuesday and results are pending    He is now on experimental therapy with possible IV docetaxel for progressive metastatic lung cancer.    He denies any recurrent palpitations dizziness or syncope.  He has some running nose at this time.  He has some fatigue.    His main concern has been cost of Eliquis.  He space around $500 for that.  He asked me if there was any alternative.    Physical exam  See below    Impression    1.  Episode of typical atrial flutter in December 2020, converted to sinus rhythm with diltiazem and now on low-dose metoprolol.  Ideally, he would need anticoagulation because his chads vascular score is 3 but given his previous history of brain metastases and the fact that his cancer is progressing, I am worried that he is at risk for recurrent brain metastasis and that would be relative contraindication to using anticoagulants.  With that in mind, I discussed his case with the oncologist, Dr. Anthony Hoffman.  His oncologist agreed that he is at risk for progression and possibility of recurrent brain metastasis.  Given this, I will stop his Eliquis and instead put him on baby aspirin.  He understands that without  anticoagulation is at risk of stroke but the risk of Eliquis and brain bleeding increases if he has progression of his lung cancer and recurrent brain metastasis.  Since my discussion with his oncologist occurred after he left the office, I will asked my nurse to call him to make that change of stopping Eliquis and starting baby aspirin.    2.  Metastatic lung cancer, management per oncologist    3.  History of abdominal aortic aneurysm repair, last ultrasound in March 2020 showed aortic graft was widely patent.  Continue statins.    Thank you for allowing us to participate in the care of this nice patient.  I will see him back in follow-up in 6 months or earlier as needed.    Sincerely,    Geoffrey Garzon MD          Orders Placed This Encounter   Procedures     Follow-Up with Cardiologist       No orders of the defined types were placed in this encounter.      There are no discontinued medications.      Encounter Diagnoses   Name Primary?     Typical atrial flutter (H)      Malignant neoplasm of lower lobe of right lung (H) Yes     Abdominal aortic aneurysm (AAA) without rupture (H)        CURRENT MEDICATIONS:  Current Outpatient Medications   Medication Sig Dispense Refill     apixaban ANTICOAGULANT (ELIQUIS ANTICOAGULANT) 5 MG tablet Take 1 tablet (5 mg) by mouth 2 times daily 60 tablet 0     Cholecalciferol (VITAMIN D3 PO) Take 1,000 Units by mouth.         metoprolol tartrate (LOPRESSOR) 25 MG tablet Take 0.5 tablets (12.5 mg) by mouth 2 times daily 90 tablet 3     multivitamin (CENTRUM SILVER) tablet Take 1 tablet by mouth daily       simvastatin (ZOCOR) 40 MG tablet TAKE ONE TABLET BY MOUTH ONCE DAILY AT BEDTIME 90 tablet 3       ALLERGIES   No Known Allergies    PAST MEDICAL HISTORY:  Past Medical History:   Diagnosis Date     Aneurysm (H)     ABDOMINAL      Calculus of kidney      Diverticulosis of colon (without mention of hemorrhage)      Hypertension      Lung cancer (H)      Malignant neoplasm of bladder,  part unspecified      Other and unspecified hyperlipidemia        PAST SURGICAL HISTORY:  Past Surgical History:   Procedure Laterality Date     ABDOMEN SURGERY      AAA     ABDOMEN SURGERY  12/10/2019    liver biopsies and removal of nodules     BRONCHOSCOPY       CYSTOSCOPY, BIOPSY BLADDER, COMBINED  2012    Procedure: COMBINED CYSTOSCOPY, BIOPSY BLADDER;;  Surgeon: Jonas Young MD;  Location:  OR     CYSTOSCOPY, RETROGRADES, COMBINED  2012    Procedure: COMBINED CYSTOSCOPY, RETROGRADES;  CYSTOSCOPY, BILATERAL RETROGRADES, BLADDER BIOPSY, FULGURATION;  Surgeon: Jonas Young MD;  Location:  OR     CYSTOSCOPY, TRANSURETHRAL RESECTION (TUR) TUMOR BLADDER, COMBINED       HERNIA REPAIR       INSERT PORT VASCULAR ACCESS N/A 2019    Procedure: INSERTION, VASCULAR ACCESS PORT FLAT PLATE X RAY;  Surgeon: Pradip Sosa MD;  Location:  OR     REPAIR ANEURYSM ABDOMINAL AORTA             FAMILY HISTORY:  Family History   Problem Relation Age of Onset     Abdominal Aortic Aneurysm Father      Abdominal Aortic Aneurysm Brother        SOCIAL HISTORY:  Social History     Socioeconomic History     Marital status:      Spouse name: None     Number of children: None     Years of education: None     Highest education level: None   Occupational History     None   Social Needs     Financial resource strain: None     Food insecurity     Worry: None     Inability: None     Transportation needs     Medical: None     Non-medical: None   Tobacco Use     Smoking status: Former Smoker     Packs/day: 1.00     Years: 45.00     Pack years: 45.00     Quit date: 3/4/2008     Years since quittin.8     Smokeless tobacco: Never Used   Substance and Sexual Activity     Alcohol use: Yes     Comment: 1 drink month     Drug use: Never     Sexual activity: Yes     Partners: Female   Lifestyle     Physical activity     Days per week: None     Minutes per session: None     Stress: None    Relationships     Social connections     Talks on phone: None     Gets together: None     Attends Roman Catholic service: None     Active member of club or organization: None     Attends meetings of clubs or organizations: None     Relationship status: None     Intimate partner violence     Fear of current or ex partner: None     Emotionally abused: None     Physically abused: None     Forced sexual activity: None   Other Topics Concern     Parent/sibling w/ CABG, MI or angioplasty before 65F 55M? Not Asked   Social History Narrative     None       Review of Systems:  Skin:  Negative       Eyes:  Negative      ENT:  Positive for   runny nose  Respiratory:  Positive for dyspnea on exertion lung cancer   Cardiovascular:  Negative;palpitations;chest pain;lightheadedness;dizziness;syncope or near-syncope;cyanosis;edema Positive for;fatigue edema in ankles  Gastroenterology: Positive for poor appetite    Genitourinary:  Negative      Musculoskeletal:  Negative      Neurologic:  Negative      Psychiatric:  Negative      Heme/Lymph/Imm:  Positive for weight loss    Endocrine:  Negative        Physical Exam:  Vitals: BP 91/63   Pulse 94   Ht 1.829 m (6')   Wt 89.7 kg (197 lb 11.2 oz)   BMI 26.81 kg/m      Constitutional:           Skin:             Head:           Eyes:           Lymph:      ENT:           Neck:           Respiratory:            Cardiac:                                                           GI:           Extremities and Muscular Skeletal:                 Neurological:           Psych:           CC  Cam Avila MD  [address] [place] [address]

## 2021-01-18 NOTE — TELEPHONE ENCOUNTER
Per DR Garzon's last note Pt's to stop anticoagulation and begin aspirin 81 mg. Contacted Pt he has stopped blood thinner and puchased 81 mg baby aspirin and will take daily. MICHAEL Silver rN

## 2021-02-12 NOTE — TELEPHONE ENCOUNTER
Needs of attention regarding:  -Wellness (Physical) Visit     Health Maintenance Topics with due status: Overdue       Topic Date Due    ADVANCE CARE PLANNING 1950    ZOSTER IMMUNIZATION 01/01/2013    MEDICARE ANNUAL WELLNESS VISIT 04/23/2020    INFLUENZA VACCINE 09/01/2020    PHQ-2 01/01/2021     Health Maintenance Topics with due status: Due Soon       Topic Date Due    FALL RISK ASSESSMENT 03/04/2021       Communication:  See Letter

## 2021-02-12 NOTE — LETTER
February 12, 2021      Luis Stapleton  [address]  [place] [address]        Dear Luis,    I care about your health and have reviewed your health plan. I have reviewed your medical conditions, medication list, and lab results and am making recommendations based on this review, to better manage your health.    You are in particular need of attention regarding:  -Wellness (Physical) Visit     I am recommending that you:  -schedule a WELLNESS (Physical) APPOINTMENT with me.   I will check fasting labs the same day - nothing to eat except water and meds for 8-10 hours prior.    Here is a list of Health Maintenance topics that are due now or due soon:  Health Maintenance Due   Topic Date Due     Discuss Advance Care Planning  1950     Zoster (Shingles) Vaccine (2 of 3) 01/01/2013     Annual Wellness Visit  04/23/2020     Flu Vaccine (1) 09/01/2020     PHQ-2  01/01/2021     FALL RISK ASSESSMENT  03/04/2021       Please call us at [phone] (or use [employer]) to address the above recommendations.     Thank you for trusting [facility] and we appreciate the opportunity to serve you.  We look forward to supporting your healthcare needs in the future.    Healthy Regards,    Cam Avila MD

## 2021-05-06 NOTE — LETTER
May 6, 2021      Luis Stapleton  [address]  [place] [address]        Dear Luis,    I care about your health and have reviewed your health plan. I have reviewed your medical conditions, medication list, and lab results and am making recommendations based on this review, to better manage your health.    You are in particular need of attention regarding:  -Wellness (Physical) Visit     I am recommending that you:  -schedule a WELLNESS (Physical) APPOINTMENT with me.   I will check fasting labs the same day - nothing to eat except water and meds for 8-10 hours prior.    Here is a list of Health Maintenance topics that are due now or due soon:  Health Maintenance Due   Topic Date Due     ANNUAL REVIEW OF HM ORDERS  Never done     Discuss Advance Care Planning  Never done     Zoster (Shingles) Vaccine (2 of 3) 01/01/2013     Annual Wellness Visit  04/23/2020     PHQ-2  01/01/2021     FALL RISK ASSESSMENT  03/04/2021       Please call us at [phone] (or use [employer]) to address the above recommendations.     Thank you for trusting [facility] and we appreciate the opportunity to serve you.  We look forward to supporting your healthcare needs in the future.    Healthy Regards,    Cam Avila MD

## 2021-05-06 NOTE — TELEPHONE ENCOUNTER
Patient Quality Outreach      Summary:    Patient has the following on his problem list/HM: None    Patient is due/failing the following:   Annual wellness, date due: 4/23/2020    Type of outreach:    Sent [employer] message.    Questions for provider review:    None                                                                                                                                     Camila Nolen CMA       Chart routed to Care Team.

## 2021-07-08 PROBLEM — C79.31 SECONDARY MALIGNANT NEOPLASM OF BRAIN (H): Status: ACTIVE | Noted: 2021-01-01

## 2021-07-08 NOTE — PROGRESS NOTES
"SUBJECTIVE:   Luis Stapleton is a 71 year old male who presents for Preventive Visit.      Patient has been advised of split billing requirements and indicates understanding: Yes   Are you in the first 12 months of your Medicare coverage?  No    Healthy Habits:     In general, how would you rate your overall health?  Good    Frequency of exercise:  4-5 days/week    Duration of exercise:  45-60 minutes    Do you usually eat at least 4 servings of fruit and vegetables a day, include whole grains    & fiber and avoid regularly eating high fat or \"junk\" foods?  Yes    Taking medications regularly:  Yes    Barriers to taking medications:  None    Medication side effects:  None    Ability to successfully perform activities of daily living:  No assistance needed    Home Safety:  Lack of grab bars in the bathroom    Hearing Impairment:  Difficulty following a conversation in a noisy restaurant or crowded room    In the past 6 months, have you been bothered by leaking of urine?  No    In general, how would you rate your overall mental or emotional health?  Good      PHQ-2 Total Score: 0    Additional concerns today:  No    Do you feel safe in your environment? 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  He is seeing oncology and he has been on chemo for 18 months but stopped a couple of months ago.  He is currently working with hospice which has been a great experience.  Overall he feels good denies any chest pains no shortness of breath.  Summer was always a good time.  He is motivated somewhat sad but he feels he want to enjoy what time he has and move on.  His family is very supportive.  Blood pressure has been stable.  Currently on metoprolol and cholesterol medication.  Also use steroid along with Lasix for bilateral leg edema.  Denies any other concerns.  Always think positive.    Have you ever done Advance Care Planning? (For example, a " Health Directive, POLST, or a discussion with a medical provider or your loved ones about your wishes): Yes, patient states has an Advance Care Planning document and will bring a copy to the clinic.      Fall risk  Fallen 2 or more times in the past year?: (P) No  Any fall with injury in the past year?: (P) No    Cognitive Screening   1) Repeat 3 items (Leader, Season, Table)    2) Clock draw: NORMAL  3) 3 item recall: Recalls 3 objects  Results: 3 items recalled: COGNITIVE IMPAIRMENT LESS LIKELY    Mini-CogTM Copyright S Tom. Licensed by the author for use in [place] [facility]; reprinted with permission (gloria@[facility]). All rights reserved.      Do you have sleep apnea, excessive snoring or daytime drowsiness?: no    Reviewed and updated as needed this visit by clinical staff  Tobacco  Allergies    Med Hx  Surg Hx  Fam Hx  Soc Hx        Reviewed and updated as needed this visit by Provider                Social History     Tobacco Use     Smoking status: Former Smoker     Packs/day: 1.00     Years: 45.00     Pack years: 45.00     Quit date: 3/4/2008     Years since quittin.3     Smokeless tobacco: Never Used   Substance Use Topics     Alcohol use: Yes     Comment: 1 drink month     If you drink alcohol do you typically have >3 drinks per day or >7 drinks per week? No    Alcohol Use 2021   Prescreen: >3 drinks/day or >7 drinks/week? No   Prescreen: >3 drinks/day or >7 drinks/week? -               Current providers sharing in care for this patient include:   Patient Care Team:  Cam Avila MD as PCP - General (Family Practice)  Cam Avila MD as Assigned PCP  Geoffrey Garzon MD as Assigned Heart and Vascular Provider    The following health maintenance items are reviewed in Epic and correct as of today:  Health Maintenance Due   Topic Date Due     ANNUAL REVIEW OF HM ORDERS  Never done     ADVANCE CARE PLANNING  Never done     ZOSTER IMMUNIZATION (2 of 3) 2013     MEDICARE ANNUAL  WELLNESS VISIT  04/23/2020     Lab work is in process          Review of Systems   Constitutional: Negative for chills and fever.   HENT: Negative for congestion, ear pain, hearing loss and sore throat.    Eyes: Negative for pain and visual disturbance.   Respiratory: Negative for cough and shortness of breath.    Cardiovascular: Positive for peripheral edema. Negative for chest pain and palpitations.   Gastrointestinal: Negative for abdominal pain, constipation, diarrhea, heartburn, hematochezia and nausea.   Genitourinary: Positive for frequency. Negative for discharge, dysuria, genital sores, hematuria, impotence and urgency.   Musculoskeletal: Negative for arthralgias, joint swelling and myalgias.   Skin: Negative for rash.   Neurological: Negative for dizziness, weakness, headaches and paresthesias.   Psychiatric/Behavioral: Negative for mood changes. The patient is not nervous/anxious.          OBJECTIVE:   There were no vitals taken for this visit. Estimated body mass index is 26.81 kg/m  as calculated from the following:    Height as of 1/13/21: 1.829 m (6').    Weight as of 1/13/21: 89.7 kg (197 lb 11.2 oz).  Physical Exam  GENERAL: healthy, alert and no distress  EYES: Eyes grossly normal to inspection, PERRL and conjunctivae and sclerae normal  HENT: ear canals and TM's normal, nose and mouth without ulcers or lesions  NECK: no adenopathy, no asymmetry, masses, or scars and thyroid normal to palpation  RESP: lungs clear to auscultation - no rales, rhonchi or wheezes  CV: regular rate and rhythm, normal S1 S2, no S3 or S4, no murmur, click or rub, no peripheral edema and peripheral pulses strong  ABDOMEN: soft, nontender, no hepatosplenomegaly, no masses and bowel sounds normal  MS: no gross musculoskeletal defects noted, no edema  SKIN: no suspicious lesions or rashes  NEURO: Normal strength and tone, mentation intact and speech normal  PSYCH: mentation appears normal, affect normal/bright    Diagnostic  Test Results:  Labs reviewed in Epic    ASSESSMENT / PLAN:   1. Encounter for Medicare annual wellness exam  We have discussion about taking the nutritional aspect.  Advised to work with hospice and see if there is any concern needs.  He will follow-up in 6 months for recheck.  Patient denied shingles vaccine  - Lipid panel reflex to direct LDL Fasting  - Comprehensive metabolic panel    2. Benign essential hypertension      3. Malignant neoplasm of lower lobe of right lung (H)      4. Abdominal aortic aneurysm (AAA) without rupture (H)  No concerns.  Pressure is stable    5. Secondary malignant neoplasm of brain (H)  Currently on hospice care working with them.    6. Other fatigue  Most likely due to his underlying condition and chemotherapy in the past.    7. Hyperlipidemia LDL goal <160  Currently on simvastatin 40 mg we will check labs medication refilled.  - Lipid panel reflex to direct LDL Fasting  - Comprehensive metabolic panel  - simvastatin (ZOCOR) 40 MG tablet; TAKE ONE TABLET BY MOUTH ONCE DAILY AT BEDTIME  Dispense: 90 tablet; Refill: 3    Patient has been advised of split billing requirements and indicates understanding: Yes  COUNSELING:  Reviewed preventive health counseling, as reflected in patient instructions       Regular exercise       Healthy diet/nutrition    Estimated body mass index is 26.81 kg/m  as calculated from the following:    Height as of 1/13/21: 1.829 m (6').    Weight as of 1/13/21: 89.7 kg (197 lb 11.2 oz).        He reports that he quit smoking about 13 years ago. He has a 45.00 pack-year smoking history. He has never used smokeless tobacco.      Appropriate preventive services were discussed with this patient, including applicable screening as appropriate for cardiovascular disease, diabetes, osteopenia/osteoporosis, and glaucoma.  As appropriate for age/gender, discussed screening for colorectal cancer, prostate cancer, breast cancer, and cervical cancer. Checklist reviewing  preventive services available has been given to the patient.    Reviewed patients plan of care and provided an AVS. The Basic Care Plan (routine screening as documented in Health Maintenance) for Luis meets the Care Plan requirement. This Care Plan has been established and reviewed with the Patient.    Counseling Resources:  ATP IV Guidelines  Pooled Cohorts Equation Calculator  Breast Cancer Risk Calculator  Breast Cancer: Medication to Reduce Risk  FRAX Risk Assessment  ICSI Preventive Guidelines  Dietary Guidelines for Americans, 2010  [employer]'s MyPlate  ASA Prophylaxis  Lung CA Screening    Cam Avila MD  [facility]    Identified Health Risks:

## (undated) DEVICE — DRAPE SHEET REV FOLD 3/4 9349

## (undated) DEVICE — PACK MINOR SBA15MIFSE

## (undated) DEVICE — NDL 19GA 1.5"

## (undated) DEVICE — SOL NACL 0.9% IRRIG 1000ML BOTTLE 2F7124

## (undated) DEVICE — DRAPE LAP TRANSVERSE 29421

## (undated) DEVICE — GOWN IMPERVIOUS ZONED LG

## (undated) DEVICE — SU DERMABOND MINI DHVM12

## (undated) DEVICE — ESU ELEC BLADE 2.75" COATED/INSULATED E1455

## (undated) DEVICE — SUCTION MINISQUAIR SMOKE EVAC CAPTURE DEVICE SQ20012-01

## (undated) DEVICE — DECANTER VIAL 2006S

## (undated) DEVICE — LINEN TOWEL PACK X5 5464

## (undated) DEVICE — LINEN GOWN OVERSIZE 5408

## (undated) DEVICE — PREP CHLORAPREP W/ORANGE TINT 10.5ML 260715

## (undated) DEVICE — SOL WATER IRRIG 1000ML BOTTLE 2F7114

## (undated) DEVICE — SU VICRYL 4-0 PS-2 18" UND J496H

## (undated) DEVICE — DECANTER BAG 2002S

## (undated) DEVICE — SU VICRYL 3-0 SH 27" J316H

## (undated) DEVICE — GLOVE PROTEXIS W/NEU-THERA 7.5  2D73TE75

## (undated) DEVICE — ADH SKIN CLOSURE PREMIERPRO EXOFIN 1.0ML 3470

## (undated) DEVICE — SYR 10ML SLIP TIP W/O NDL

## (undated) DEVICE — SOL NACL 0.9% INJ 250ML BAG 2B1322Q

## (undated) DEVICE — TUBING SUCTION MINISQUAIR SMOKE EVAC NONSTERILE SQNS20018-01

## (undated) DEVICE — ESU PENCIL W/HOLSTER E2350H

## (undated) RX ORDER — FENTANYL CITRATE 50 UG/ML
INJECTION, SOLUTION INTRAMUSCULAR; INTRAVENOUS
Status: DISPENSED
Start: 2019-12-27

## (undated) RX ORDER — CEFAZOLIN SODIUM 1 G/3ML
INJECTION, POWDER, FOR SOLUTION INTRAMUSCULAR; INTRAVENOUS
Status: DISPENSED
Start: 2019-12-27

## (undated) RX ORDER — ONDANSETRON 2 MG/ML
INJECTION INTRAMUSCULAR; INTRAVENOUS
Status: DISPENSED
Start: 2019-12-27

## (undated) RX ORDER — LIDOCAINE HYDROCHLORIDE 20 MG/ML
INJECTION, SOLUTION EPIDURAL; INFILTRATION; INTRACAUDAL; PERINEURAL
Status: DISPENSED
Start: 2019-12-27

## (undated) RX ORDER — HEPARIN SODIUM 1000 [USP'U]/ML
INJECTION, SOLUTION INTRAVENOUS; SUBCUTANEOUS
Status: DISPENSED
Start: 2019-12-27

## (undated) RX ORDER — HEPARIN SODIUM (PORCINE) LOCK FLUSH IV SOLN 100 UNIT/ML 100 UNIT/ML
SOLUTION INTRAVENOUS
Status: DISPENSED
Start: 2019-12-27

## (undated) RX ORDER — LIDOCAINE HYDROCHLORIDE 10 MG/ML
INJECTION, SOLUTION INFILTRATION; PERINEURAL
Status: DISPENSED
Start: 2019-12-27